# Patient Record
Sex: FEMALE | Race: WHITE | NOT HISPANIC OR LATINO | Employment: OTHER | URBAN - NONMETROPOLITAN AREA
[De-identification: names, ages, dates, MRNs, and addresses within clinical notes are randomized per-mention and may not be internally consistent; named-entity substitution may affect disease eponyms.]

---

## 2017-12-02 ENCOUNTER — HOSPITAL ENCOUNTER (EMERGENCY)
Facility: HOSPITAL | Age: 47
Discharge: HOME | End: 2017-12-02
Attending: EMERGENCY MEDICINE | Admitting: EMERGENCY MEDICINE

## 2017-12-02 ENCOUNTER — LAB REQUISITION (OUTPATIENT)
Dept: LAB | Facility: HOSPITAL | Age: 47
End: 2017-12-02

## 2017-12-02 VITALS
WEIGHT: 215 LBS | HEIGHT: 67 IN | HEART RATE: 99 BPM | RESPIRATION RATE: 18 BRPM | DIASTOLIC BLOOD PRESSURE: 77 MMHG | TEMPERATURE: 98.9 F | OXYGEN SATURATION: 98 % | SYSTOLIC BLOOD PRESSURE: 118 MMHG | BODY MASS INDEX: 33.74 KG/M2

## 2017-12-02 DIAGNOSIS — Z00.00 ROUTINE GENERAL MEDICAL EXAMINATION AT A HEALTH CARE FACILITY: ICD-10-CM

## 2017-12-02 DIAGNOSIS — D64.9 ANEMIA, UNSPECIFIED TYPE: Primary | ICD-10-CM

## 2017-12-02 DIAGNOSIS — D63.1 ANEMIA IN CHRONIC KIDNEY DISEASE (CODE): ICD-10-CM

## 2017-12-02 LAB
ABO GROUP BLD: NORMAL
ABO GROUP BLD: NORMAL
BLD GP AB SCN SERPL QL: NEGATIVE
DEVELOPER EXPIRATION DATE: NORMAL
DEVELOPER LOT NUMBER: NORMAL
EXPIRATION DATE: NORMAL
FECAL OCCULT BLOOD SCREEN, POC: NEGATIVE
HCT VFR BLD AUTO: 18.9 % (ref 37–47)
HGB BLD-MCNC: 5.9 G/DL (ref 12–16)
Lab: NORMAL
NEGATIVE CONTROL: NEGATIVE
POSITIVE CONTROL: POSITIVE
RH BLD: NEGATIVE
RH BLD: NEGATIVE

## 2017-12-02 PROCEDURE — 85018 HEMOGLOBIN: CPT

## 2017-12-02 PROCEDURE — 86900 BLOOD TYPING SEROLOGIC ABO: CPT

## 2017-12-02 PROCEDURE — 86901 BLOOD TYPING SEROLOGIC RH(D): CPT | Performed by: PHYSICIAN ASSISTANT

## 2017-12-02 PROCEDURE — 86850 RBC ANTIBODY SCREEN: CPT | Performed by: PHYSICIAN ASSISTANT

## 2017-12-02 PROCEDURE — 86901 BLOOD TYPING SEROLOGIC RH(D): CPT

## 2017-12-02 PROCEDURE — P9016 RBC LEUKOCYTES REDUCED: HCPCS

## 2017-12-02 PROCEDURE — 82270 OCCULT BLOOD FECES: CPT | Performed by: PHYSICIAN ASSISTANT

## 2017-12-02 PROCEDURE — 86900 BLOOD TYPING SEROLOGIC ABO: CPT | Performed by: PHYSICIAN ASSISTANT

## 2017-12-02 PROCEDURE — 36430 TRANSFUSION BLD/BLD COMPNT: CPT

## 2017-12-02 PROCEDURE — 86920 COMPATIBILITY TEST SPIN: CPT

## 2017-12-02 PROCEDURE — 99284 EMERGENCY DEPT VISIT MOD MDM: CPT

## 2017-12-02 PROCEDURE — 85014 HEMATOCRIT: CPT

## 2017-12-02 RX ORDER — FAMOTIDINE 10 MG
20 TABLET ORAL DAILY
COMMUNITY
End: 2019-12-10

## 2017-12-02 RX ORDER — SODIUM CHLORIDE 9 MG/ML
INJECTION, SOLUTION INTRAVENOUS
Status: DISCONTINUED
Start: 2017-12-02 | End: 2017-12-02 | Stop reason: HOSPADM

## 2017-12-02 RX ORDER — SODIUM CHLORIDE 9 MG/ML
50 INJECTION, SOLUTION INTRAVENOUS CONTINUOUS
Status: DISCONTINUED | OUTPATIENT
Start: 2017-12-02 | End: 2017-12-02 | Stop reason: HOSPADM

## 2017-12-02 RX ORDER — SODIUM CHLORIDE 0.9 % (FLUSH) 0.9 %
10 SYRINGE (ML) INJECTION AS NEEDED
Status: DISCONTINUED | OUTPATIENT
Start: 2017-12-02 | End: 2017-12-02 | Stop reason: HOSPADM

## 2017-12-02 RX ORDER — AMLODIPINE BESYLATE 5 MG/1
5 TABLET ORAL DAILY
Status: ON HOLD | COMMUNITY
End: 2018-06-30

## 2017-12-02 RX ORDER — SODIUM CHLORIDE 9 MG/ML
INJECTION, SOLUTION INTRAVENOUS
Status: COMPLETED
Start: 2017-12-02 | End: 2017-12-02

## 2017-12-02 RX ORDER — BUMETANIDE 1 MG/1
2 TABLET ORAL 2 TIMES DAILY
COMMUNITY
End: 2019-12-10

## 2017-12-02 RX ORDER — HYDROXYZINE PAMOATE 25 MG/1
50 CAPSULE ORAL 4 TIMES DAILY
COMMUNITY
End: 2019-12-10 | Stop reason: SDUPTHER

## 2017-12-02 RX ORDER — TOPIRAMATE 50 MG/1
50 TABLET, FILM COATED ORAL 2 TIMES DAILY
Status: ON HOLD | COMMUNITY
End: 2018-06-30

## 2017-12-02 RX ADMIN — SODIUM CHLORIDE: 0.9 INJECTION, SOLUTION INTRAVENOUS at 13:56

## 2017-12-02 RX ADMIN — SODIUM CHLORIDE: 9 INJECTION, SOLUTION INTRAVENOUS at 13:56

## 2017-12-02 NOTE — ED PROVIDER NOTES
Subjective   Patient is a 47 y.o. female presenting with weakness.   Weakness - Generalized   Severity:  Moderate  Onset quality:  Gradual  Duration:  3 days  Timing:  Constant  Progression:  Worsening  Chronicity:  Recurrent  Context comment:  She was sent from dialysis to receive blood.  She states that she's been feeling weak and as if she were anemic over the past 3 days.  She's had multiple transfusions since August.  She states that she does not see a hematologist.  Associated symptoms: no abdominal pain, no chest pain, no cough, no diarrhea, no dysuria, no fever, no hematochezia, no melena, no nausea and no shortness of breath    Risk factors: anemia        Review of Systems   Constitutional: Negative.  Negative for fever.   HENT: Negative.    Respiratory: Negative.  Negative for cough and shortness of breath.    Cardiovascular: Negative.  Negative for chest pain.   Gastrointestinal: Negative.  Negative for abdominal pain, diarrhea, hematochezia, melena and nausea.   Endocrine: Negative.    Genitourinary: Negative.  Negative for dysuria.   Skin: Negative.    Psychiatric/Behavioral: Negative.    All other systems reviewed and are negative.      Past Medical History:   Diagnosis Date   • Dialysis patient    • Histoplasmosis    • Hypertension    • Renal disorder        Allergies   Allergen Reactions   • Latex        Past Surgical History:   Procedure Laterality Date   • TUBAL ABDOMINAL LIGATION         History reviewed. No pertinent family history.    Social History     Social History   • Marital status: Unknown     Spouse name: N/A   • Number of children: N/A   • Years of education: N/A     Social History Main Topics   • Smoking status: Current Every Day Smoker     Packs/day: 0.50     Types: Cigarettes   • Smokeless tobacco: None   • Alcohol use No   • Drug use: No   • Sexual activity: Not Asked     Other Topics Concern   • None     Social History Narrative   • None           Objective   Physical Exam    Constitutional: She is oriented to person, place, and time. She appears well-developed and well-nourished. No distress.   Pale   HENT:   Head: Normocephalic and atraumatic.   Right Ear: External ear normal.   Left Ear: External ear normal.   Nose: Nose normal.   Eyes: Conjunctivae and EOM are normal. Pupils are equal, round, and reactive to light.   Neck: Normal range of motion. Neck supple. No JVD present. No tracheal deviation present.   Cardiovascular: Normal rate, regular rhythm and normal heart sounds.    No murmur heard.  Pulmonary/Chest: Effort normal and breath sounds normal. No respiratory distress. She has no wheezes.   Abdominal: Soft. Bowel sounds are normal. There is no tenderness.   Musculoskeletal: Normal range of motion. She exhibits no edema or deformity.   Neurological: She is alert and oriented to person, place, and time. No cranial nerve deficit.   Skin: Skin is warm and dry. No rash noted. She is not diaphoretic. No erythema. No pallor.   Psychiatric: She has a normal mood and affect. Her behavior is normal. Thought content normal.   Nursing note and vitals reviewed.      Procedures         ED Course  ED Course                  MDM  Number of Diagnoses or Management Options  established and worsening     Amount and/or Complexity of Data Reviewed  Clinical lab tests: reviewed  Discuss the patient with other providers: yes    Risk of Complications, Morbidity, and/or Mortality  Presenting problems: moderate        Final diagnoses:   Anemia, unspecified type            ELIZABETH Farrar  12/02/17 1931

## 2017-12-03 LAB
ABO + RH BLD: NORMAL
ABO + RH BLD: NORMAL
BH BB BLOOD EXPIRATION DATE: NORMAL
BH BB BLOOD EXPIRATION DATE: NORMAL
BH BB BLOOD TYPE BARCODE: 9500
BH BB BLOOD TYPE BARCODE: 9500
BH BB DISPENSE STATUS: NORMAL
BH BB DISPENSE STATUS: NORMAL
BH BB PRODUCT CODE: NORMAL
BH BB PRODUCT CODE: NORMAL
BH BB UNIT NUMBER: NORMAL
BH BB UNIT NUMBER: NORMAL
CROSSMATCH INTERPRETATION: NORMAL
CROSSMATCH INTERPRETATION: NORMAL
UNIT  ABO: NORMAL
UNIT  ABO: NORMAL
UNIT  RH: NORMAL
UNIT  RH: NORMAL

## 2018-06-30 ENCOUNTER — ANESTHESIA EVENT (OUTPATIENT)
Dept: PERIOP | Facility: HOSPITAL | Age: 48
End: 2018-06-30

## 2018-06-30 ENCOUNTER — APPOINTMENT (OUTPATIENT)
Dept: GENERAL RADIOLOGY | Facility: HOSPITAL | Age: 48
End: 2018-06-30

## 2018-06-30 ENCOUNTER — HOSPITAL ENCOUNTER (OUTPATIENT)
Facility: HOSPITAL | Age: 48
Discharge: HOME | End: 2018-07-01
Attending: FAMILY MEDICINE | Admitting: INTERNAL MEDICINE

## 2018-06-30 ENCOUNTER — ANESTHESIA (OUTPATIENT)
Dept: PERIOP | Facility: HOSPITAL | Age: 48
End: 2018-06-30

## 2018-06-30 DIAGNOSIS — N18.6 ESRD (END STAGE RENAL DISEASE) (HCC): ICD-10-CM

## 2018-06-30 DIAGNOSIS — Z78.9 PROBLEM WITH VASCULAR ACCESS: Primary | ICD-10-CM

## 2018-06-30 LAB
ALBUMIN SERPL-MCNC: 4 G/DL (ref 3.5–5)
ALBUMIN/GLOB SERPL: 1.7 G/DL (ref 1.5–2.5)
ALP SERPL-CCNC: 78 U/L (ref 35–104)
ALT SERPL W P-5'-P-CCNC: 14 U/L (ref 10–36)
ANION GAP SERPL CALCULATED.3IONS-SCNC: 6.2 MMOL/L (ref 3.6–11.2)
AST SERPL-CCNC: 20 U/L (ref 10–30)
BASOPHILS # BLD AUTO: 0.04 10*3/MM3 (ref 0–0.3)
BASOPHILS NFR BLD AUTO: 0.5 % (ref 0–2)
BILIRUB SERPL-MCNC: 0.2 MG/DL (ref 0.2–1.8)
BUN BLD-MCNC: 40 MG/DL (ref 7–21)
BUN/CREAT SERPL: 9 (ref 7–25)
CALCIUM SPEC-SCNC: 9.2 MG/DL (ref 7.7–10)
CHLORIDE SERPL-SCNC: 111 MMOL/L (ref 99–112)
CO2 SERPL-SCNC: 25.8 MMOL/L (ref 24.3–31.9)
CREAT BLD-MCNC: 4.45 MG/DL (ref 0.43–1.29)
DEPRECATED RDW RBC AUTO: 57.7 FL (ref 37–54)
EOSINOPHIL # BLD AUTO: 0.47 10*3/MM3 (ref 0–0.7)
EOSINOPHIL NFR BLD AUTO: 5.6 % (ref 0–5)
ERYTHROCYTE [DISTWIDTH] IN BLOOD BY AUTOMATED COUNT: 15.6 % (ref 11.5–14.5)
GFR SERPL CREATININE-BSD FRML MDRD: 11 ML/MIN/1.73
GLOBULIN UR ELPH-MCNC: 2.3 GM/DL
GLUCOSE BLD-MCNC: 95 MG/DL (ref 70–110)
HCT VFR BLD AUTO: 29.9 % (ref 37–47)
HGB BLD-MCNC: 9.6 G/DL (ref 12–16)
HOLD SPECIMEN: NORMAL
HOLD SPECIMEN: NORMAL
IMM GRANULOCYTES # BLD: 0.05 10*3/MM3 (ref 0–0.03)
IMM GRANULOCYTES NFR BLD: 0.6 % (ref 0–0.5)
INR PPP: 1.03 (ref 0.9–1.1)
LYMPHOCYTES # BLD AUTO: 1.31 10*3/MM3 (ref 1–3)
LYMPHOCYTES NFR BLD AUTO: 15.6 % (ref 21–51)
MAGNESIUM SERPL-MCNC: 2 MG/DL (ref 1.7–2.6)
MCH RBC QN AUTO: 33.4 PG (ref 27–33)
MCHC RBC AUTO-ENTMCNC: 32.1 G/DL (ref 33–37)
MCV RBC AUTO: 104.2 FL (ref 80–94)
MONOCYTES # BLD AUTO: 0.63 10*3/MM3 (ref 0.1–0.9)
MONOCYTES NFR BLD AUTO: 7.5 % (ref 0–10)
NEUTROPHILS # BLD AUTO: 5.9 10*3/MM3 (ref 1.4–6.5)
NEUTROPHILS NFR BLD AUTO: 70.2 % (ref 30–70)
OSMOLALITY SERPL CALC.SUM OF ELEC: 294.5 MOSM/KG (ref 273–305)
PHOSPHATE SERPL-MCNC: 3.7 MG/DL (ref 2.7–4.5)
PLATELET # BLD AUTO: 232 10*3/MM3 (ref 130–400)
PMV BLD AUTO: 8.5 FL (ref 6–10)
POTASSIUM BLD-SCNC: 4.2 MMOL/L (ref 3.5–5.3)
PROT SERPL-MCNC: 6.3 G/DL (ref 6–8)
PROTHROMBIN TIME: 13.8 SECONDS (ref 11–15.4)
RBC # BLD AUTO: 2.87 10*6/MM3 (ref 4.2–5.4)
SODIUM BLD-SCNC: 143 MMOL/L (ref 135–153)
TROPONIN I SERPL-MCNC: 0.01 NG/ML
TSH SERPL DL<=0.05 MIU/L-ACNC: 0.83 MIU/ML (ref 0.55–4.78)
WBC NRBC COR # BLD: 8.4 10*3/MM3 (ref 4.5–12.5)
WHOLE BLOOD HOLD SPECIMEN: NORMAL
WHOLE BLOOD HOLD SPECIMEN: NORMAL

## 2018-06-30 PROCEDURE — 71045 X-RAY EXAM CHEST 1 VIEW: CPT

## 2018-06-30 PROCEDURE — 99219 PR INITIAL OBSERVATION CARE/DAY 50 MINUTES: CPT | Performed by: INTERNAL MEDICINE

## 2018-06-30 PROCEDURE — 84484 ASSAY OF TROPONIN QUANT: CPT | Performed by: FAMILY MEDICINE

## 2018-06-30 PROCEDURE — 25010000002 PROPOFOL 10 MG/ML EMULSION: Performed by: ANESTHESIOLOGY

## 2018-06-30 PROCEDURE — 94799 UNLISTED PULMONARY SVC/PX: CPT

## 2018-06-30 PROCEDURE — 84100 ASSAY OF PHOSPHORUS: CPT | Performed by: FAMILY MEDICINE

## 2018-06-30 PROCEDURE — 80053 COMPREHEN METABOLIC PANEL: CPT | Performed by: FAMILY MEDICINE

## 2018-06-30 PROCEDURE — 76000 FLUOROSCOPY <1 HR PHYS/QHP: CPT

## 2018-06-30 PROCEDURE — 93005 ELECTROCARDIOGRAM TRACING: CPT | Performed by: FAMILY MEDICINE

## 2018-06-30 PROCEDURE — 99284 EMERGENCY DEPT VISIT MOD MDM: CPT

## 2018-06-30 PROCEDURE — G0378 HOSPITAL OBSERVATION PER HR: HCPCS

## 2018-06-30 PROCEDURE — 71045 X-RAY EXAM CHEST 1 VIEW: CPT | Performed by: RADIOLOGY

## 2018-06-30 PROCEDURE — 87040 BLOOD CULTURE FOR BACTERIA: CPT | Performed by: NURSE PRACTITIONER

## 2018-06-30 PROCEDURE — 25010000002 FENTANYL CITRATE (PF) 100 MCG/2ML SOLUTION: Performed by: ANESTHESIOLOGY

## 2018-06-30 PROCEDURE — 93010 ELECTROCARDIOGRAM REPORT: CPT | Performed by: INTERNAL MEDICINE

## 2018-06-30 PROCEDURE — 36415 COLL VENOUS BLD VENIPUNCTURE: CPT

## 2018-06-30 PROCEDURE — C1750 CATH, HEMODIALYSIS,LONG-TERM: HCPCS | Performed by: SURGERY

## 2018-06-30 PROCEDURE — 25010000002 VANCOMYCIN PER 500 MG: Performed by: SURGERY

## 2018-06-30 PROCEDURE — 25010000002 HEPARIN FLUSH (PORCINE) 100 UNIT/ML SOLUTION: Performed by: SURGERY

## 2018-06-30 PROCEDURE — 85025 COMPLETE CBC W/AUTO DIFF WBC: CPT | Performed by: FAMILY MEDICINE

## 2018-06-30 PROCEDURE — 83735 ASSAY OF MAGNESIUM: CPT | Performed by: FAMILY MEDICINE

## 2018-06-30 PROCEDURE — 84443 ASSAY THYROID STIM HORMONE: CPT | Performed by: NURSE PRACTITIONER

## 2018-06-30 PROCEDURE — 85610 PROTHROMBIN TIME: CPT | Performed by: FAMILY MEDICINE

## 2018-06-30 RX ORDER — MULTIVITAMIN
1 TABLET ORAL DAILY
Status: DISCONTINUED | OUTPATIENT
Start: 2018-07-01 | End: 2018-07-02 | Stop reason: HOSPADM

## 2018-06-30 RX ORDER — PANTOPRAZOLE SODIUM 40 MG/1
40 TABLET, DELAYED RELEASE ORAL DAILY
Status: DISCONTINUED | OUTPATIENT
Start: 2018-07-01 | End: 2018-07-02 | Stop reason: HOSPADM

## 2018-06-30 RX ORDER — HEPARIN SODIUM 5000 [USP'U]/ML
INJECTION, SOLUTION INTRAVENOUS; SUBCUTANEOUS
Status: DISPENSED
Start: 2018-06-30 | End: 2018-06-30

## 2018-06-30 RX ORDER — ITRACONAZOLE 100 MG/1
200 CAPSULE ORAL 2 TIMES DAILY
COMMUNITY
End: 2021-02-10

## 2018-06-30 RX ORDER — HYDROXYZINE 50 MG/1
50 TABLET, FILM COATED ORAL EVERY 6 HOURS
Status: DISCONTINUED | OUTPATIENT
Start: 2018-06-30 | End: 2018-07-02 | Stop reason: HOSPADM

## 2018-06-30 RX ORDER — HEPARIN SODIUM 5000 [USP'U]/ML
5000 INJECTION, SOLUTION INTRAVENOUS; SUBCUTANEOUS EVERY 12 HOURS SCHEDULED
Status: DISCONTINUED | OUTPATIENT
Start: 2018-06-30 | End: 2018-07-02 | Stop reason: HOSPADM

## 2018-06-30 RX ORDER — LEVOTHYROXINE SODIUM 0.05 MG/1
50 TABLET ORAL DAILY
COMMUNITY

## 2018-06-30 RX ORDER — BUPIVACAINE HYDROCHLORIDE AND EPINEPHRINE 5; 5 MG/ML; UG/ML
INJECTION, SOLUTION EPIDURAL; INTRACAUDAL; PERINEURAL AS NEEDED
Status: DISCONTINUED | OUTPATIENT
Start: 2018-06-30 | End: 2018-06-30 | Stop reason: HOSPADM

## 2018-06-30 RX ORDER — FAMOTIDINE 20 MG/1
20 TABLET, FILM COATED ORAL 2 TIMES DAILY
Status: DISCONTINUED | OUTPATIENT
Start: 2018-06-30 | End: 2018-06-30

## 2018-06-30 RX ORDER — CYCLOPHOSPHAMIDE 50 MG/1
50 TABLET ORAL 2 TIMES DAILY
COMMUNITY
End: 2019-12-10

## 2018-06-30 RX ORDER — GABAPENTIN 400 MG/1
800 CAPSULE ORAL EVERY 8 HOURS SCHEDULED
Status: CANCELLED | OUTPATIENT
Start: 2018-06-30

## 2018-06-30 RX ORDER — METHOCARBAMOL 750 MG/1
750 TABLET, FILM COATED ORAL 4 TIMES DAILY PRN
Status: ON HOLD | COMMUNITY
End: 2018-06-30

## 2018-06-30 RX ORDER — BACTERIOSTATIC SODIUM CHLORIDE 0.9 %
VIAL (ML) INJECTION AS NEEDED
Status: DISCONTINUED | OUTPATIENT
Start: 2018-06-30 | End: 2018-06-30 | Stop reason: HOSPADM

## 2018-06-30 RX ORDER — METHOCARBAMOL 750 MG/1
750 TABLET, FILM COATED ORAL 3 TIMES DAILY PRN
COMMUNITY

## 2018-06-30 RX ORDER — TOPIRAMATE 100 MG/1
100 TABLET, FILM COATED ORAL 2 TIMES DAILY
COMMUNITY
End: 2019-12-10

## 2018-06-30 RX ORDER — TOPIRAMATE 100 MG/1
100 TABLET, FILM COATED ORAL EVERY 12 HOURS SCHEDULED
Status: DISCONTINUED | OUTPATIENT
Start: 2018-06-30 | End: 2018-07-02 | Stop reason: HOSPADM

## 2018-06-30 RX ORDER — IPRATROPIUM BROMIDE AND ALBUTEROL SULFATE 2.5; .5 MG/3ML; MG/3ML
3 SOLUTION RESPIRATORY (INHALATION) ONCE AS NEEDED
Status: DISCONTINUED | OUTPATIENT
Start: 2018-06-30 | End: 2018-06-30 | Stop reason: HOSPADM

## 2018-06-30 RX ORDER — FAMOTIDINE 20 MG/1
20 TABLET, FILM COATED ORAL DAILY
Status: DISCONTINUED | OUTPATIENT
Start: 2018-07-01 | End: 2018-07-02 | Stop reason: HOSPADM

## 2018-06-30 RX ORDER — SODIUM CHLORIDE 0.9 % (FLUSH) 0.9 %
1-10 SYRINGE (ML) INJECTION AS NEEDED
Status: DISCONTINUED | OUTPATIENT
Start: 2018-06-30 | End: 2018-06-30 | Stop reason: HOSPADM

## 2018-06-30 RX ORDER — ERGOCALCIFEROL 1.25 MG/1
50000 CAPSULE ORAL WEEKLY
COMMUNITY
End: 2019-12-10

## 2018-06-30 RX ORDER — SODIUM CHLORIDE 0.9 % (FLUSH) 0.9 %
10 SYRINGE (ML) INJECTION AS NEEDED
Status: DISCONTINUED | OUTPATIENT
Start: 2018-06-30 | End: 2018-07-02 | Stop reason: HOSPADM

## 2018-06-30 RX ORDER — CYCLOPHOSPHAMIDE 50 MG/1
50 CAPSULE ORAL 2 TIMES DAILY
Status: DISCONTINUED | OUTPATIENT
Start: 2018-06-30 | End: 2018-07-02 | Stop reason: HOSPADM

## 2018-06-30 RX ORDER — LEVOTHYROXINE SODIUM 0.03 MG/1
25 TABLET ORAL DAILY
Status: ON HOLD | COMMUNITY
End: 2018-06-30

## 2018-06-30 RX ORDER — MEPERIDINE HYDROCHLORIDE 50 MG/ML
12.5 INJECTION INTRAMUSCULAR; INTRAVENOUS; SUBCUTANEOUS
Status: DISCONTINUED | OUTPATIENT
Start: 2018-06-30 | End: 2018-06-30 | Stop reason: HOSPADM

## 2018-06-30 RX ORDER — MAGNESIUM HYDROXIDE 1200 MG/15ML
LIQUID ORAL AS NEEDED
Status: DISCONTINUED | OUTPATIENT
Start: 2018-06-30 | End: 2018-06-30 | Stop reason: HOSPADM

## 2018-06-30 RX ORDER — CALCIUM ACETATE 667 MG/1
667 CAPSULE ORAL 4 TIMES DAILY
Status: DISCONTINUED | OUTPATIENT
Start: 2018-06-30 | End: 2018-07-02 | Stop reason: HOSPADM

## 2018-06-30 RX ORDER — SODIUM CHLORIDE 9 MG/ML
INJECTION, SOLUTION INTRAVENOUS CONTINUOUS PRN
Status: DISCONTINUED | OUTPATIENT
Start: 2018-06-30 | End: 2018-06-30 | Stop reason: SURG

## 2018-06-30 RX ORDER — GABAPENTIN 800 MG/1
800 TABLET ORAL 3 TIMES DAILY
COMMUNITY
End: 2018-07-01 | Stop reason: HOSPADM

## 2018-06-30 RX ORDER — SODIUM CHLORIDE 0.9 % (FLUSH) 0.9 %
1-10 SYRINGE (ML) INJECTION AS NEEDED
Status: DISCONTINUED | OUTPATIENT
Start: 2018-06-30 | End: 2018-07-02 | Stop reason: HOSPADM

## 2018-06-30 RX ORDER — PANTOPRAZOLE SODIUM 40 MG/1
40 TABLET, DELAYED RELEASE ORAL DAILY
COMMUNITY
End: 2018-07-01 | Stop reason: HOSPADM

## 2018-06-30 RX ORDER — FENTANYL CITRATE 50 UG/ML
50 INJECTION, SOLUTION INTRAMUSCULAR; INTRAVENOUS
Status: DISCONTINUED | OUTPATIENT
Start: 2018-06-30 | End: 2018-06-30 | Stop reason: HOSPADM

## 2018-06-30 RX ORDER — LEVOTHYROXINE SODIUM 0.05 MG/1
50 TABLET ORAL DAILY
Status: DISCONTINUED | OUTPATIENT
Start: 2018-07-01 | End: 2018-07-02 | Stop reason: HOSPADM

## 2018-06-30 RX ORDER — MULTIVITAMIN
1 TABLET ORAL DAILY
COMMUNITY
End: 2021-02-10

## 2018-06-30 RX ORDER — BUMETANIDE 1 MG/1
2 TABLET ORAL 2 TIMES DAILY
Status: DISCONTINUED | OUTPATIENT
Start: 2018-06-30 | End: 2018-07-02 | Stop reason: HOSPADM

## 2018-06-30 RX ORDER — PROPOFOL 10 MG/ML
VIAL (ML) INTRAVENOUS AS NEEDED
Status: DISCONTINUED | OUTPATIENT
Start: 2018-06-30 | End: 2018-06-30 | Stop reason: SURG

## 2018-06-30 RX ORDER — METHOCARBAMOL 500 MG/1
500 TABLET, FILM COATED ORAL EVERY 8 HOURS PRN
Status: DISCONTINUED | OUTPATIENT
Start: 2018-06-30 | End: 2018-07-02 | Stop reason: HOSPADM

## 2018-06-30 RX ORDER — SODIUM CHLORIDE, SODIUM LACTATE, POTASSIUM CHLORIDE, CALCIUM CHLORIDE 600; 310; 30; 20 MG/100ML; MG/100ML; MG/100ML; MG/100ML
125 INJECTION, SOLUTION INTRAVENOUS CONTINUOUS
Status: DISCONTINUED | OUTPATIENT
Start: 2018-06-30 | End: 2018-07-01

## 2018-06-30 RX ORDER — ONDANSETRON 2 MG/ML
4 INJECTION INTRAMUSCULAR; INTRAVENOUS ONCE AS NEEDED
Status: DISCONTINUED | OUTPATIENT
Start: 2018-06-30 | End: 2018-06-30 | Stop reason: HOSPADM

## 2018-06-30 RX ORDER — NITROGLYCERIN 0.4 MG/1
0.4 TABLET SUBLINGUAL
Status: DISCONTINUED | OUTPATIENT
Start: 2018-06-30 | End: 2018-07-02 | Stop reason: HOSPADM

## 2018-06-30 RX ORDER — GABAPENTIN 300 MG/1
300 CAPSULE ORAL NIGHTLY
Status: DISCONTINUED | OUTPATIENT
Start: 2018-06-30 | End: 2018-07-02 | Stop reason: HOSPADM

## 2018-06-30 RX ORDER — ITRACONAZOLE 100 MG/1
200 CAPSULE ORAL 2 TIMES DAILY
Status: DISCONTINUED | OUTPATIENT
Start: 2018-07-01 | End: 2018-07-02 | Stop reason: HOSPADM

## 2018-06-30 RX ADMIN — PROPOFOL 100 MG: 10 INJECTION, EMULSION INTRAVENOUS at 17:56

## 2018-06-30 RX ADMIN — VANCOMYCIN HYDROCHLORIDE 1750 MG: 5 INJECTION, POWDER, LYOPHILIZED, FOR SOLUTION INTRAVENOUS at 19:00

## 2018-06-30 RX ADMIN — FENTANYL CITRATE 250 MCG: 50 INJECTION, SOLUTION INTRAMUSCULAR; INTRAVENOUS at 17:56

## 2018-06-30 RX ADMIN — SODIUM CHLORIDE: 9 INJECTION, SOLUTION INTRAVENOUS at 17:51

## 2018-06-30 RX ADMIN — GABAPENTIN 300 MG: 300 CAPSULE ORAL at 21:22

## 2018-06-30 RX ADMIN — METOPROLOL TARTRATE 12.5 MG: 25 TABLET, FILM COATED ORAL at 21:22

## 2018-06-30 RX ADMIN — TOPIRAMATE 100 MG: 100 TABLET, FILM COATED ORAL at 21:22

## 2018-06-30 NOTE — ANESTHESIA PREPROCEDURE EVALUATION
Anesthesia Evaluation     Patient summary reviewed and Nursing notes reviewed   history of anesthetic complications: prolonged sedation  NPO Solid Status: > 8 hours  NPO Liquid Status: > 8 hours           Airway   Mallampati: III  TM distance: <3 FB  Neck ROM: limited  Difficult intubation highly probable, Small opening, Anterior and Large neck circumference  Dental - normal exam   (+) poor dentition    Pulmonary - normal exam   (+) a smoker Current Smoked day of surgery, sleep apnea on CPAP,   (-) asthma  Cardiovascular - normal exam  Exercise tolerance: good (4-7 METS)    NYHA Classification: II  Patient on routine beta blocker and Beta blocker given within 24 hours of surgery    (+) hypertension, dysrhythmias,   (-) past MI, angina, CHF      Neuro/Psych- negative ROS  (-) seizures, CVA  GI/Hepatic/Renal/Endo    (+) morbid obesity, GERD,  renal disease ESRD, dialysis and CRI, hypothyroidism,     Musculoskeletal     (+) back pain, radiculopathy Left lower extremity  Abdominal  - normal exam    Bowel sounds: normal.   Substance History - negative use     OB/GYN negative ob/gyn ROS         Other   (+) arthritis         Phys Exam Other: Dental counseling discussed              Anesthesia Plan    ASA 4     general     intravenous induction   Anesthetic plan and risks discussed with patient.  Use of blood products discussed with patient  Consented to blood products.

## 2018-06-30 NOTE — ANESTHESIA POSTPROCEDURE EVALUATION
Patient: Mercedes Che    Procedure Summary     Date:  06/30/18 Room / Location:  [facility] OR 02 /  [facility] OR    Anesthesia Start:  1751 Anesthesia Stop:  1904    Procedure:  REMOVAL AND INSERTION OF TUNNELED DIAYLSIS CATHETER (N/A ) Diagnosis:       ESRD (end stage renal disease)      Problem with vascular access      (ESRD (end stage renal disease) [N18.6])      (Problem with vascular access [Z78.9])    Surgeon:  Jose Alberto Urena MD Provider:  Geovanni Zuniga MD    Anesthesia Type:  general ASA Status:  4          Anesthesia Type: general  Last vitals  BP   130/74 (06/30/18 1906)   Temp   97.2 °F (36.2 °C) (06/30/18 1901)   Pulse   99 (06/30/18 1906)   Resp   18 (06/30/18 1906)     SpO2   100 % (06/30/18 1906)     Post Anesthesia Care and Evaluation    Patient location during evaluation: PACU  Patient participation: complete - patient participated  Level of consciousness: awake and alert  Pain score: 1  Pain management: adequate  Airway patency: patent  Anesthetic complications: No anesthetic complications  PONV Status: controlled  Cardiovascular status: acceptable  Respiratory status: acceptable  Hydration status: acceptable

## 2018-07-01 ENCOUNTER — APPOINTMENT (OUTPATIENT)
Dept: GENERAL RADIOLOGY | Facility: HOSPITAL | Age: 48
End: 2018-07-01

## 2018-07-01 ENCOUNTER — ANESTHESIA EVENT (OUTPATIENT)
Dept: PERIOP | Facility: HOSPITAL | Age: 48
End: 2018-07-01

## 2018-07-01 ENCOUNTER — ANESTHESIA (OUTPATIENT)
Dept: PERIOP | Facility: HOSPITAL | Age: 48
End: 2018-07-01

## 2018-07-01 VITALS
HEIGHT: 67 IN | TEMPERATURE: 97.7 F | SYSTOLIC BLOOD PRESSURE: 120 MMHG | OXYGEN SATURATION: 97 % | HEART RATE: 118 BPM | RESPIRATION RATE: 18 BRPM | WEIGHT: 272 LBS | BODY MASS INDEX: 42.69 KG/M2 | DIASTOLIC BLOOD PRESSURE: 80 MMHG

## 2018-07-01 PROBLEM — Z78.9 PROBLEM WITH VASCULAR ACCESS: Status: ACTIVE | Noted: 2018-06-30

## 2018-07-01 PROBLEM — Z78.9 PROBLEM WITH VASCULAR ACCESS: Status: RESOLVED | Noted: 2018-06-30 | Resolved: 2018-07-01

## 2018-07-01 LAB
ALBUMIN SERPL-MCNC: 3.7 G/DL (ref 3.5–5)
ALBUMIN/GLOB SERPL: 1.6 G/DL (ref 1.5–2.5)
ALP SERPL-CCNC: 66 U/L (ref 35–104)
ALT SERPL W P-5'-P-CCNC: 12 U/L (ref 10–36)
ANION GAP SERPL CALCULATED.3IONS-SCNC: 5.9 MMOL/L (ref 3.6–11.2)
AST SERPL-CCNC: 18 U/L (ref 10–30)
BILIRUB SERPL-MCNC: 0.2 MG/DL (ref 0.2–1.8)
BUN BLD-MCNC: 37 MG/DL (ref 7–21)
BUN/CREAT SERPL: 7.9 (ref 7–25)
CALCIUM SPEC-SCNC: 9.2 MG/DL (ref 7.7–10)
CHLORIDE SERPL-SCNC: 109 MMOL/L (ref 99–112)
CO2 SERPL-SCNC: 22.1 MMOL/L (ref 24.3–31.9)
CREAT BLD-MCNC: 4.71 MG/DL (ref 0.43–1.29)
DEPRECATED RDW RBC AUTO: 60.3 FL (ref 37–54)
EOSINOPHIL # BLD MANUAL: 0.29 10*3/MM3 (ref 0–0.7)
EOSINOPHIL NFR BLD MANUAL: 3 % (ref 0–5)
ERYTHROCYTE [DISTWIDTH] IN BLOOD BY AUTOMATED COUNT: 15.9 % (ref 11.5–14.5)
FOLATE SERPL-MCNC: 16.26 NG/ML (ref 5.4–20)
GFR SERPL CREATININE-BSD FRML MDRD: 10 ML/MIN/1.73
GLOBULIN UR ELPH-MCNC: 2.3 GM/DL
GLUCOSE BLD-MCNC: 100 MG/DL (ref 70–110)
HCT VFR BLD AUTO: 27.6 % (ref 37–47)
HGB BLD-MCNC: 8.8 G/DL (ref 12–16)
LYMPHOCYTES # BLD MANUAL: 0.97 10*3/MM3 (ref 1–3)
LYMPHOCYTES NFR BLD MANUAL: 10 % (ref 21–51)
LYMPHOCYTES NFR BLD MANUAL: 3 % (ref 0–10)
MAGNESIUM SERPL-MCNC: 2 MG/DL (ref 1.7–2.6)
MCH RBC QN AUTO: 32.7 PG (ref 27–33)
MCHC RBC AUTO-ENTMCNC: 31.9 G/DL (ref 33–37)
MCV RBC AUTO: 102.6 FL (ref 80–94)
MONOCYTES # BLD AUTO: 0.29 10*3/MM3 (ref 0.1–0.9)
NEUTROPHILS # BLD AUTO: 8.12 10*3/MM3 (ref 1.4–6.5)
NEUTROPHILS NFR BLD MANUAL: 84 % (ref 30–70)
OSMOLALITY SERPL CALC.SUM OF ELEC: 282.6 MOSM/KG (ref 273–305)
PHOSPHATE SERPL-MCNC: 4.5 MG/DL (ref 2.7–4.5)
PLAT MORPH BLD: NORMAL
PLATELET # BLD AUTO: 203 10*3/MM3 (ref 130–400)
PMV BLD AUTO: 8.8 FL (ref 6–10)
POTASSIUM BLD-SCNC: 4.3 MMOL/L (ref 3.5–5.3)
PROT SERPL-MCNC: 6 G/DL (ref 6–8)
RBC # BLD AUTO: 2.69 10*6/MM3 (ref 4.2–5.4)
RBC MORPH BLD: NORMAL
SCAN SLIDE: NORMAL
SODIUM BLD-SCNC: 137 MMOL/L (ref 135–153)
VIT B12 BLD-MCNC: 614 PG/ML (ref 211–911)
WBC NRBC COR # BLD: 9.67 10*3/MM3 (ref 4.5–12.5)

## 2018-07-01 PROCEDURE — G0378 HOSPITAL OBSERVATION PER HR: HCPCS

## 2018-07-01 PROCEDURE — 25010000002 SUCCINYLCHOLINE PER 20 MG: Performed by: ANESTHESIOLOGY

## 2018-07-01 PROCEDURE — 71045 X-RAY EXAM CHEST 1 VIEW: CPT | Performed by: RADIOLOGY

## 2018-07-01 PROCEDURE — 25010000002 DEXAMETHASONE PER 1 MG: Performed by: ANESTHESIOLOGY

## 2018-07-01 PROCEDURE — 71045 X-RAY EXAM CHEST 1 VIEW: CPT

## 2018-07-01 PROCEDURE — 80053 COMPREHEN METABOLIC PANEL: CPT | Performed by: NURSE PRACTITIONER

## 2018-07-01 PROCEDURE — 36581 REPLACE TUNNELED CV CATH: CPT | Performed by: SURGERY

## 2018-07-01 PROCEDURE — 84100 ASSAY OF PHOSPHORUS: CPT | Performed by: NURSE PRACTITIONER

## 2018-07-01 PROCEDURE — 85025 COMPLETE CBC W/AUTO DIFF WBC: CPT | Performed by: NURSE PRACTITIONER

## 2018-07-01 PROCEDURE — 83735 ASSAY OF MAGNESIUM: CPT | Performed by: NURSE PRACTITIONER

## 2018-07-01 PROCEDURE — 36589 REMOVAL TUNNELED CV CATH: CPT | Performed by: SURGERY

## 2018-07-01 PROCEDURE — 25010000002 ONDANSETRON PER 1 MG: Performed by: ANESTHESIOLOGY

## 2018-07-01 PROCEDURE — 82607 VITAMIN B-12: CPT | Performed by: INTERNAL MEDICINE

## 2018-07-01 PROCEDURE — 25010000002 FENTANYL CITRATE (PF) 100 MCG/2ML SOLUTION: Performed by: ANESTHESIOLOGY

## 2018-07-01 PROCEDURE — 25010000002 PROPOFOL 10 MG/ML EMULSION: Performed by: ANESTHESIOLOGY

## 2018-07-01 PROCEDURE — 25010000002 MORPHINE PER 10 MG: Performed by: SURGERY

## 2018-07-01 PROCEDURE — 99217 PR OBSERVATION CARE DISCHARGE MANAGEMENT: CPT | Performed by: NURSE PRACTITIONER

## 2018-07-01 PROCEDURE — 94799 UNLISTED PULMONARY SVC/PX: CPT

## 2018-07-01 PROCEDURE — 76000 FLUOROSCOPY <1 HR PHYS/QHP: CPT

## 2018-07-01 PROCEDURE — 25010000002 HEPARIN FLUSH (PORCINE) 100 UNIT/ML SOLUTION: Performed by: SURGERY

## 2018-07-01 PROCEDURE — 63710000001 CYCLOPHOSPHAMIDE PER 25 MG: Performed by: NURSE PRACTITIONER

## 2018-07-01 PROCEDURE — G0257 UNSCHED DIALYSIS ESRD PT HOS: HCPCS

## 2018-07-01 PROCEDURE — C1750 CATH, HEMODIALYSIS,LONG-TERM: HCPCS | Performed by: SURGERY

## 2018-07-01 PROCEDURE — 82746 ASSAY OF FOLIC ACID SERUM: CPT | Performed by: INTERNAL MEDICINE

## 2018-07-01 RX ORDER — BUPIVACAINE HYDROCHLORIDE AND EPINEPHRINE 2.5; 5 MG/ML; UG/ML
INJECTION, SOLUTION EPIDURAL; INFILTRATION; INTRACAUDAL; PERINEURAL AS NEEDED
Status: DISCONTINUED | OUTPATIENT
Start: 2018-07-01 | End: 2018-07-01 | Stop reason: HOSPADM

## 2018-07-01 RX ORDER — PROPOFOL 10 MG/ML
VIAL (ML) INTRAVENOUS AS NEEDED
Status: DISCONTINUED | OUTPATIENT
Start: 2018-07-01 | End: 2018-07-01 | Stop reason: SURG

## 2018-07-01 RX ORDER — SUCCINYLCHOLINE CHLORIDE 20 MG/ML
INJECTION INTRAMUSCULAR; INTRAVENOUS AS NEEDED
Status: DISCONTINUED | OUTPATIENT
Start: 2018-07-01 | End: 2018-07-01 | Stop reason: SURG

## 2018-07-01 RX ORDER — FENTANYL CITRATE 50 UG/ML
50 INJECTION, SOLUTION INTRAMUSCULAR; INTRAVENOUS
Status: DISCONTINUED | OUTPATIENT
Start: 2018-07-01 | End: 2018-07-01 | Stop reason: HOSPADM

## 2018-07-01 RX ORDER — GABAPENTIN 300 MG/1
300 CAPSULE ORAL NIGHTLY
Qty: 3 CAPSULE | Refills: 0 | Status: SHIPPED | OUTPATIENT
Start: 2018-07-01 | End: 2019-12-10

## 2018-07-01 RX ORDER — ONDANSETRON 2 MG/ML
4 INJECTION INTRAMUSCULAR; INTRAVENOUS ONCE AS NEEDED
Status: DISCONTINUED | OUTPATIENT
Start: 2018-07-01 | End: 2018-07-01 | Stop reason: HOSPADM

## 2018-07-01 RX ORDER — ACETAMINOPHEN 325 MG/1
650 TABLET ORAL EVERY 6 HOURS PRN
Status: DISCONTINUED | OUTPATIENT
Start: 2018-07-01 | End: 2018-07-02 | Stop reason: HOSPADM

## 2018-07-01 RX ORDER — OXYCODONE HYDROCHLORIDE AND ACETAMINOPHEN 5; 325 MG/1; MG/1
1 TABLET ORAL ONCE AS NEEDED
Status: DISCONTINUED | OUTPATIENT
Start: 2018-07-01 | End: 2018-07-01 | Stop reason: HOSPADM

## 2018-07-01 RX ORDER — SODIUM CHLORIDE 0.9 % (FLUSH) 0.9 %
1-10 SYRINGE (ML) INJECTION AS NEEDED
Status: DISCONTINUED | OUTPATIENT
Start: 2018-07-01 | End: 2018-07-01 | Stop reason: HOSPADM

## 2018-07-01 RX ORDER — MEPERIDINE HYDROCHLORIDE 25 MG/ML
12.5 INJECTION INTRAMUSCULAR; INTRAVENOUS; SUBCUTANEOUS
Status: DISCONTINUED | OUTPATIENT
Start: 2018-07-01 | End: 2018-07-01 | Stop reason: HOSPADM

## 2018-07-01 RX ORDER — OXYCODONE AND ACETAMINOPHEN 10; 325 MG/1; MG/1
1 TABLET ORAL EVERY 4 HOURS PRN
Qty: 20 TABLET | Refills: 0
Start: 2018-07-01 | End: 2019-12-10

## 2018-07-01 RX ORDER — ROCURONIUM BROMIDE 10 MG/ML
INJECTION, SOLUTION INTRAVENOUS AS NEEDED
Status: DISCONTINUED | OUTPATIENT
Start: 2018-07-01 | End: 2018-07-01 | Stop reason: SURG

## 2018-07-01 RX ORDER — SODIUM CHLORIDE, SODIUM LACTATE, POTASSIUM CHLORIDE, CALCIUM CHLORIDE 600; 310; 30; 20 MG/100ML; MG/100ML; MG/100ML; MG/100ML
125 INJECTION, SOLUTION INTRAVENOUS CONTINUOUS
Status: DISCONTINUED | OUTPATIENT
Start: 2018-07-01 | End: 2018-07-01

## 2018-07-01 RX ORDER — MAGNESIUM HYDROXIDE 1200 MG/15ML
LIQUID ORAL AS NEEDED
Status: DISCONTINUED | OUTPATIENT
Start: 2018-07-01 | End: 2018-07-01 | Stop reason: HOSPADM

## 2018-07-01 RX ORDER — SODIUM CHLORIDE 9 MG/ML
INJECTION, SOLUTION INTRAVENOUS CONTINUOUS PRN
Status: DISCONTINUED | OUTPATIENT
Start: 2018-07-01 | End: 2018-07-01 | Stop reason: SURG

## 2018-07-01 RX ORDER — IPRATROPIUM BROMIDE AND ALBUTEROL SULFATE 2.5; .5 MG/3ML; MG/3ML
3 SOLUTION RESPIRATORY (INHALATION) ONCE AS NEEDED
Status: DISCONTINUED | OUTPATIENT
Start: 2018-07-01 | End: 2018-07-01 | Stop reason: HOSPADM

## 2018-07-01 RX ORDER — FAMOTIDINE 10 MG/ML
INJECTION, SOLUTION INTRAVENOUS AS NEEDED
Status: DISCONTINUED | OUTPATIENT
Start: 2018-07-01 | End: 2018-07-01 | Stop reason: SURG

## 2018-07-01 RX ORDER — BACTERIOSTATIC SODIUM CHLORIDE 0.9 %
VIAL (ML) INJECTION AS NEEDED
Status: DISCONTINUED | OUTPATIENT
Start: 2018-07-01 | End: 2018-07-01 | Stop reason: HOSPADM

## 2018-07-01 RX ORDER — DEXAMETHASONE SODIUM PHOSPHATE 4 MG/ML
INJECTION, SOLUTION INTRA-ARTICULAR; INTRALESIONAL; INTRAMUSCULAR; INTRAVENOUS; SOFT TISSUE AS NEEDED
Status: DISCONTINUED | OUTPATIENT
Start: 2018-07-01 | End: 2018-07-01 | Stop reason: SURG

## 2018-07-01 RX ADMIN — METOPROLOL TARTRATE 12.5 MG: 25 TABLET, FILM COATED ORAL at 09:51

## 2018-07-01 RX ADMIN — CYCLOPHOSPHAMIDE 50 MG: 50 CAPSULE ORAL at 09:49

## 2018-07-01 RX ADMIN — PROPOFOL 100 MG: 10 INJECTION, EMULSION INTRAVENOUS at 14:20

## 2018-07-01 RX ADMIN — MORPHINE SULFATE 4 MG: 4 INJECTION, SOLUTION INTRAMUSCULAR; INTRAVENOUS at 13:01

## 2018-07-01 RX ADMIN — ITRACONAZOLE 200 MG: 100 CAPSULE ORAL at 09:50

## 2018-07-01 RX ADMIN — SODIUM CHLORIDE 1000 ML: 9 INJECTION, SOLUTION INTRAVENOUS at 18:17

## 2018-07-01 RX ADMIN — ITRACONAZOLE 200 MG: 100 CAPSULE ORAL at 20:50

## 2018-07-01 RX ADMIN — METOPROLOL TARTRATE 12.5 MG: 25 TABLET, FILM COATED ORAL at 20:48

## 2018-07-01 RX ADMIN — BUMETANIDE 2 MG: 1 TABLET ORAL at 09:52

## 2018-07-01 RX ADMIN — BUMETANIDE 2 MG: 1 TABLET ORAL at 20:47

## 2018-07-01 RX ADMIN — FAMOTIDINE 20 MG: 10 INJECTION, SOLUTION INTRAVENOUS at 14:29

## 2018-07-01 RX ADMIN — ROCURONIUM BROMIDE 10 MG: 10 INJECTION INTRAVENOUS at 14:23

## 2018-07-01 RX ADMIN — OFLOXACIN 50000 UNITS: 300 TABLET, COATED ORAL at 09:51

## 2018-07-01 RX ADMIN — TOPIRAMATE 100 MG: 100 TABLET, FILM COATED ORAL at 20:47

## 2018-07-01 RX ADMIN — FENTANYL CITRATE 250 MCG: 50 INJECTION, SOLUTION INTRAMUSCULAR; INTRAVENOUS at 14:20

## 2018-07-01 RX ADMIN — ACETAMINOPHEN 650 MG: 325 TABLET, FILM COATED ORAL at 04:17

## 2018-07-01 RX ADMIN — LEVOTHYROXINE SODIUM 50 MCG: 50 TABLET ORAL at 09:52

## 2018-07-01 RX ADMIN — DEXAMETHASONE SODIUM PHOSPHATE 4 MG: 4 INJECTION, SOLUTION INTRAMUSCULAR; INTRAVENOUS at 14:29

## 2018-07-01 RX ADMIN — Medication 1 TABLET: at 09:51

## 2018-07-01 RX ADMIN — CYCLOPHOSPHAMIDE 50 MG: 50 CAPSULE ORAL at 20:52

## 2018-07-01 RX ADMIN — ONDANSETRON 4 MG: 2 INJECTION INTRAMUSCULAR; INTRAVENOUS at 14:29

## 2018-07-01 RX ADMIN — SUCCINYLCHOLINE CHLORIDE 100 MG: 20 INJECTION, SOLUTION INTRAMUSCULAR; INTRAVENOUS at 14:20

## 2018-07-01 RX ADMIN — PANTOPRAZOLE SODIUM 40 MG: 40 TABLET, DELAYED RELEASE ORAL at 09:50

## 2018-07-01 RX ADMIN — TOPIRAMATE 100 MG: 100 TABLET, FILM COATED ORAL at 09:51

## 2018-07-01 RX ADMIN — GABAPENTIN 300 MG: 300 CAPSULE ORAL at 20:47

## 2018-07-01 RX ADMIN — SODIUM CHLORIDE: 9 INJECTION, SOLUTION INTRAVENOUS at 14:09

## 2018-07-01 RX ADMIN — FAMOTIDINE 20 MG: 20 TABLET, FILM COATED ORAL at 09:52

## 2018-07-01 NOTE — ANESTHESIA POSTPROCEDURE EVALUATION
Patient: Mercedes Che    Procedure Summary     Date:  07/01/18 Room / Location:  [facility] OR 02 /  [facility] OR    Anesthesia Start:  1409 Anesthesia Stop:      Procedure:  INSERTION AND REMOVAL OF NEW TUNNELED DIAYLSIS CATHETER (Right ) Diagnosis:       ESRD (end stage renal disease)      Problem with vascular access      (ESRD (end stage renal disease) [N18.6])      (Problem with vascular access [Z78.9])    Surgeon:  Jose Alberto Urena MD Provider:  Geovanni Zuniga MD    Anesthesia Type:  general ASA Status:  4          Anesthesia Type: general  Last vitals  BP   136/87 (07/01/18 1517)   Temp   97.7 °F (36.5 °C) (07/01/18 1512)   Pulse   100 (07/01/18 1517)   Resp   16 (07/01/18 1517)     SpO2   97 % (07/01/18 1517)     Post Anesthesia Care and Evaluation    Patient location during evaluation: PACU  Patient participation: complete - patient participated  Level of consciousness: awake and alert  Pain score: 1  Pain management: adequate  Airway patency: patent  Anesthetic complications: No anesthetic complications  PONV Status: controlled  Cardiovascular status: acceptable  Respiratory status: acceptable  Hydration status: acceptable

## 2018-07-01 NOTE — NURSING NOTE
Called and spoke to El[facility] RN at [phone]. Ask her about new dialysis catheter that she tried to use this AM. She states that she was not able to get blood return from either port of the dialysis catheter and that it was leaking. She states she has called Dr. Urena and left a message for him and that she had talked with Dr. Bedolla and he was going to try to get in touch with Dr. Urena as well. I will attempt to get in touch with Dr. Urena to make sure he is aware.

## 2018-07-01 NOTE — DISCHARGE SUMMARY
"    [facility] DISCHARGE SUMMARY    Patient Identification:  Name:  Mercedes Che  Age:  47 y.o.  Sex:  female  :  1970  MRN:  [id number]  Visit Number:  [id number]    Date of Admission: 2018  Date of Discharge:  2018     PCP: Whitney Angel, SHAVON    DISCHARGE DIAGNOSIS    Primary:    ESRD on HD  Temporary Dialysis Catheter access lost, resolved    Secondary:    Macrocytic Anemia, on epogen   Hypothyroidism  Histoplasmosis   Neuropathy  GERD  Obesity       CONSULTS   Dr. Bedolla, Nephrology   Dr. Urena, General Surgery     PROCEDURES PERFORMED    Dialysis Catheter access, RCW, x2 placements, 18, 18      HOSPITAL COURSE    Patient is a 47 y.o. female presented to [facility] complaining of loss of temporary dialysis catheter access.  Please see the admitting history and physical for further details. Mrs. Che was admitted on 18. She had been at dialysis and they were unable to access her RCW [id number]. She was sent to the ER for further evaluation, it was found the catheter was not working. She underwent a new temporary access on the day of admission, 18, by Dr. Urena. When the dialysis nurse was attempting to start HD on 18 it would not draw and was leaking. This morning, , 2018 she underwent what is her fifth temporary dialysis access. She does have a fistula in her left arm but is not able to be used. She had missed dialysis Thursday due to an appointment with Dr. Brennan to have her fistula \"worked on\". She is having dialysis today and is being discharged there after with the appropriate vital signs and clinical status. On discharge we have continued with her decreased dose of Gabapentin which is now renal appropriate. She will follow up with her PCP in 1 week. She will continue dialysis on her regular scheduled of Tuesday, Thursday, Saturday and see nephrology while in clinic.       VITAL SIGNS:  Temp:  [97.2 °F (36.2 °C)-98.8 " °F (37.1 °C)] 97.8 °F (36.6 °C)  Heart Rate:  [] 96  Resp:  [16-20] 18  BP: (104-149)/(53-89) 142/88  SpO2:  [95 %-100 %] 96 %  on  Flow (L/min):  [2-4] 2;   Device (Oxygen Therapy): nasal cannula    Body mass index is 42.6 kg/m².  Wt Readings from Last 3 Encounters:   06/30/18 123 kg (272 lb)   12/02/17 97.5 kg (215 lb)       PHYSICAL EXAM:    Constitutional:  Well-developed and well-nourished.  No respiratory distress.      HENT:  Head: Normocephalic and atraumatic.  Mouth:  Moist mucous membranes.    Eyes:  Conjunctivae and EOM are normal.  Pupils are equal, round, and reactive to light.  No scleral icterus.    Neck:  Neck supple.  No JVD present.    Cardiovascular:  Normal rate, regular rhythm and normal heart sounds with no murmur.  Pulmonary/Chest:  No respiratory distress, no wheezes, no crackles, with normal breath sounds and good air movement.  Abdominal:  Soft.  Bowel sounds are normal.  No distension and no tenderness.   Musculoskeletal:  Generalized edema, no tenderness, and no deformity.  No red or swollen joints anywhere.    Neurological:  Alert and oriented to person, place, and time.  No cranial nerve deficit.  No tongue deviation.  No facial droop.  No slurred speech.   Skin: Skin is warm and dry. No rash noted. No pallor.   Psychiatric:  Normal mood and affect.  Behavior is normal.  Judgment and thought content normal.   Peripheral vascular:  strong pulses on all 4 extremities with no clubbing, no cyanosis, and no edema.  Genitourinary:      DISCHARGE DISPOSITION   Stable    DISCHARGE MEDICATIONS:     Discharge Medications      New Medications      Instructions Start Date   gabapentin 300 MG capsule  Commonly known as:  NEURONTIN  Replaces:  gabapentin 800 MG tablet   300 mg, Oral, Nightly      oxyCODONE-acetaminophen  MG per tablet  Commonly known as:  PERCOCET   1 tablet, Oral, Every 4 Hours PRN         Continue These Medications      Instructions Start Date   bumetanide 1 MG  tablet  Commonly known as:  BUMEX   2 mg, Oral, 2 Times Daily      calcium acetate 667 MG capsule  Commonly known as:  PHOSLO   667 mg, Oral, 4 Times Daily      cyclophosphamide 50 MG chemo tablet  Commonly known as:  CYTOXAN   50 mg, Oral, 2 Times Daily      famotidine 10 MG tablet  Commonly known as:  PEPCID   20 mg, Oral, Daily      hydrOXYzine 25 MG capsule  Commonly known as:  VISTARIL   50 mg, Oral, 4 Times Daily      itraconazole 100 MG capsule  Commonly known as:  SPORANOX   200 mg, Oral, 2 Times Daily, Prior to [facility] Admission, Patient was on: Take 200 mg twice daily for the first week of each month and repeat for three months. Filled 06/18/2018. Last took 1st week of Lauren      levothyroxine 50 MCG tablet  Commonly known as:  SYNTHROID, LEVOTHROID   50 mcg, Oral, Daily      methocarbamol 750 MG tablet  Commonly known as:  ROBAXIN   750 mg, Oral, 3 Times Daily      metoprolol tartrate 25 MG tablet  Commonly known as:  LOPRESSOR   12.5 mg, Oral, 2 Times Daily      multivitamin tablet tablet   1 tablet, Oral, Daily      topiramate 100 MG tablet  Commonly known as:  TOPAMAX   100 mg, Oral, 2 Times Daily      vitamin D 59795 units capsule capsule  Commonly known as:  ERGOCALCIFEROL   50,000 Units, Oral, Weekly, Prior to [facility] Admission, Patient was on: Takes on Sundays          Stop These Medications    gabapentin 800 MG tablet  Commonly known as:  NEURONTIN  Replaced by:  gabapentin 300 MG capsule     pantoprazole 40 MG EC tablet  Commonly known as:  PROTONIX            Diet Instructions     Diet: Regular, Renal       Discharge Diet:   Regular  Renal           No future appointments.    Additional Instructions for the Follow-ups that You Need to Schedule     Discharge Follow-up with PCP    As directed      Currently Documented PCP:  SHAVON Chambers  PCP Phone Number:  [phone]    Follow Up Details:  SHAVON Chambers 1 week         Discharge Follow-up with Specified Provider: Nephrology, to see in  dialysis clinic continue [id number]-[id number] schedule    As directed      To:  Nephrology, to see in dialysis clinic continue [id number]-eczaxhoo-[id number] schedule           Follow-up Information     SHAVON Chambers Follow up.    Specialty:  Family Medicine  Why:  SHAVON Chambers 1 week  Contact information:  [address] [place] [id number] [phone]                    TEST  RESULTS PENDING AT DISCHARGE   Order Current Status    Blood Culture - Blood, Preliminary result    Blood Culture - Blood, Preliminary result           CODE STATUS  Code Status and Medical Interventions:   Ordered at: 07/01/18 1552     Level Of Support Discussed With:    Patient     Code Status:    CPR     Medical Interventions (Level of Support Prior to Arrest):    Full       SHAVON Trevino  07/01/18  6:24 PM    Please note that this discharge summary required more than 30 minutes to complete.    Please send a copy of this dictation to the following providers:  SHAVON Chambers

## 2018-07-01 NOTE — PROGRESS NOTES
Patient Identification:  Name:  Mercedes Che  Age:  47 y.o.  Sex:  female  :  1970  MRN:  [id number]  Visit Number:  [id number]  Primary Care Provider:  SHAVON Chambers    Length of stay:  0    Subjective:      Mrs. Che is a 47 year old female who was admitted on 18 for her temporary dialysis access not working. It was replaced yesterday (18) by Dr. Urena, however, her new access is leaking, the dialysis nurse was here this morning and was not able to get the port to draw blood and it continued to leak. FLACA Samaniego (dialsyis nurse) also notified nephrology of no access for HD. Dr. Urena is aware, patient is NPO will await his arrival to fix catheter and go forth with HD as directed by nephrology. She is lying in bed, no distress noted, she denies nausea, vomiting, no abdominal pain, no pain at dialysis access site. Spoke with patients nurse Long.   ----------------------------------------------------------------------------------------------------------------------  Current Hospital Meds:    [MAR Hold] bumetanide 2 mg Oral BID   [MAR Hold] calcium acetate 667 mg Oral 4x Daily   [MAR Hold] cholecalciferol 50,000 Units Oral Weekly   [MAR Hold] cyclophosphamide 50 mg Oral BID   famotidine 20 mg Oral Daily   gabapentin 300 mg Oral Nightly   [MAR Hold] heparin (porcine) 5,000 Units Subcutaneous Q12H   [MAR Hold] hydrOXYzine 50 mg Oral Q6H   [MAR Hold] itraconazole 200 mg Oral BID   [MAR Hold] levothyroxine 50 mcg Oral Daily   metoprolol tartrate 12.5 mg Oral Q12H   [MAR Hold] multivitamin 1 tablet Oral Daily   [MAR Hold] pantoprazole 40 mg Oral Daily   topiramate 100 mg Oral Q12H       lactated ringers 125 mL/hr     ----------------------------------------------------------------------------------------------------------------------  Vital Signs:  Temp:  [97.2 °F (36.2 °C)-98.6 °F (37 °C)] 97.7 °F (36.5 °C)  Heart Rate:  [] 97  Resp:  [16-20] 20  BP: (100-149)/(53-88) 112/53  1     06/30/18  0828 06/30/18  1120   Weight: 109 kg (240 lb) 123 kg (272 lb)     Body mass index is 42.6 kg/m².    Intake/Output Summary (Last 24 hours) at 07/01/18 0758  Last data filed at 07/01/18 0300   Gross per 24 hour   Intake              950 ml   Output              100 ml   Net              850 ml     No intake/output data recorded.  Diet Regular; Consistent Carbohydrate, Cardiac  ----------------------------------------------------------------------------------------------------------------------  Physical exam:  Constitutional:  Well-developed and well-nourished.  No respiratory distress.      HENT:  Head:  Normocephalic and atraumatic.  Mouth:  Moist mucous membranes.    Eyes:  Conjunctivae and EOM are normal.  Pupils are equal, round, and reactive to light.  No scleral icterus.    Neck:  Neck supple.  No JVD present.    Cardiovascular:  Normal rate, regular rhythm and normal heart sounds with no murmur.  Pulmonary/Chest:  No respiratory distress, no wheezes, no crackles, with normal breath sounds and good air movement.  Abdominal:  Soft.  Bowel sounds are normal.  No distension and no tenderness.   Musculoskeletal:  Generalized edema, no tenderness, and no deformity.  No red or swollen joints anywhere.    Neurological:  Alert and oriented to person, place, and time.  No cranial nerve deficit.  No tongue deviation.  No facial droop.  No slurred speech.   Skin:  Skin is warm and dry. No rash noted. No pallor. Fistula left arm   ----------------------------------------------------------------------------------------------------------------------  Tele:    ----------------------------------------------------------------------------------------------------------------------    Results from last 7 days  Lab Units 06/30/18 0915   TROPONIN I ng/mL 0.009       Results from last 7 days  Lab Units 07/01/18 0428 06/30/18 0915   WBC 10*3/mm3 9.67 8.40   HEMOGLOBIN g/dL 8.8* 9.6*   HEMATOCRIT % 27.6* 29.9*   MCV fL  102.6* 104.2*   MCHC g/dL 31.9* 32.1*   PLATELETS 10*3/mm3 203 232   INR   --  1.03           Results from last 7 days  Lab Units 07/01/18  0428 06/30/18  0915   SODIUM mmol/L 137 143   POTASSIUM mmol/L 4.3 4.2   MAGNESIUM mg/dL 2.0 2.0   CHLORIDE mmol/L 109 111   CO2 mmol/L 22.1* 25.8   BUN mg/dL 37* 40*   CREATININE mg/dL 4.71* 4.45*   EGFR IF NONAFRICN AM mL/min/1.73 10* 11*   CALCIUM mg/dL 9.2 9.2   GLUCOSE mg/dL 100 95   ALBUMIN g/dL 3.70 4.00   BILIRUBIN mg/dL 0.2 0.2   ALK PHOS U/L 66 78   AST (SGOT) U/L 18 20   ALT (SGPT) U/L 12 14   Estimated Creatinine Clearance: 19.9 mL/min (A) (by C-G formula based on SCr of 4.71 mg/dL (H)).  No results found for: AMMONIA      Blood Culture   Date Value Ref Range Status   06/30/2018 No growth at less than 24 hours  Preliminary   06/30/2018 No growth at less than 24 hours  Preliminary              ----------------------------------------------------------------------------------------------------------------------  Imaging Results (last 24 hours)     Procedure Component Value Units Date/Time    FL Surgery Fluoro [[id number]] Updated:  06/30/18 1910    XR Chest 1 View [[id number]] Collected:  06/30/18 0945     Updated:  06/30/18 0948    Narrative:       EXAMINATION: XR CHEST 1 VW-      CLINICAL INDICATION:     soa     TECHNIQUE:  XR CHEST 1 VW-      COMPARISON: 04/26/2013      FINDINGS:   Right tunneled dialysis catheter is noted.  Lungs are aerated.   Heart size, mediastinum, and pulmonary vascularity are stable from  previous.   No pneumothorax.   No effusions.   Stable appearance of the bony structures.            Impression:       No acute cardiopulmonary findings.     This report was finalized on 6/30/2018 9:46 AM by Dr. Flaquito Cook MD.           ----------------------------------------------------------------------------------------------------------------------  Assessment and Plan:    ESRD on HD  Loss of temporary HD access  Essential HTN    Hypothyroidism  Macrocytic anemia  Obesity  Hx of histoplasmosis      ESRD on HD, Loss of temporary HD access: temporary access that was placed yesterday is leaking and not able to draw blood, she is NPO to have this fixed again today. Nephrology and General surgery aware. Potassium is 4.3, creatinine is 4.71, BUN is 37, will monitor closely.      Hypothyroidism: continue PO synthroid, no concerns.       Macrocytic anemia: Vit B12 is normal at 614, and folate is normal at 16.26. Receives epogen as an outpatient. H/H this morning is 8.8/27.6 from 9.6/29.9 yesterday, likely dilutional from fluid overload, repeat in the AM, no s/s of bleeding.      Essential HTN: BP controlled 104//80 no concerns currently.     Hx of Histoplasmosis: continue sporanox (she states will take from 3 years and started in November 2017).      DVT prophylaxis: SQ heparin      The patient is high risk due to the following diagnoses/reasons:  ESRD on HD     SHAVON Trevino  07/01/18  7:58 AM

## 2018-07-01 NOTE — PLAN OF CARE
Problem: Patient Care Overview  Goal: Plan of Care Review   07/01/18 0408   Plan of Care Review   Progress no change   Coping/Psychosocial   Plan of Care Reviewed With patient

## 2018-07-01 NOTE — OP NOTE
Mercedes Che  6/30/2018 - 7/1/2018      Operative Progress Note:    Surgeon and Assistant: Dr. Urena    Pre-Operative Diagnosis: leaking tunneled dialysis catheter    Post-Operative Diagnosis: leaking tunneled dialysis catheter    Procedure(s):  removal and replacement of tunneled dialysis catheter    Type of Anesthesia Administered: IV general    Estimated Blood Loss: Minimal    Blood Products: None    Specimen Obtained/Removed:None    Complication(s):  None    Graft/Implant/Prosthetics/Implanted Device/Transplants: None    Indication: patient's 47-year-old white female    Findings: leaking of tunneled dialysis catheter at the most distal connection.    Operative Report:  Patient taken operating room and laid in the spine position on operating table.  IV sedation was given after the neck and chest are prepped draped usual sterile fashion.  Irrigated the catheter and see that there was a leak at the connection on the most distal aspect of the catheter.  I opened the incision at the neck and cut the catheter and placed a guidewire down into the superior vena cava.  I then placed a new catheter over the guidewire and tunneled it appropriately.  Fluoroscopic guidance was used.  Catheter irrigated very nicely with copious amounts of saline.  There is excellent back flow of blood.  Heparinized saline was placed in each port.  Incision at the neck was closed subsequent for Monocryl.  Catheter was secured with 2-0 silk.  Sterile dressing applied.  Procedure terminated.  Patient thought procedure very well was returned recovery room satisfactory condition.       Electronically Signed by: Jose Alberto Urena MD        Dictated Utilizing Dragon Dictation

## 2018-07-01 NOTE — PLAN OF CARE
Problem: Patient Care Overview  Goal: Plan of Care Review  Outcome: Ongoing (interventions implemented as appropriate)    Goal: Individualization and Mutuality  Outcome: Ongoing (interventions implemented as appropriate)      Problem: Fall Risk (Adult)  Goal: Identify Related Risk Factors and Signs and Symptoms  Outcome: Outcome(s) achieved Date Met: 07/01/18    Goal: Absence of Fall  Outcome: Ongoing (interventions implemented as appropriate)      Problem: Infection, Risk/Actual (Adult)  Goal: Identify Related Risk Factors and Signs and Symptoms  Outcome: Outcome(s) achieved Date Met: 07/01/18    Goal: Infection Prevention/Resolution  Outcome: Ongoing (interventions implemented as appropriate)

## 2018-07-01 NOTE — NURSING NOTE
Dialysis catheter found to be leaking. Catheter dressing and patient's gown are wet. Dr. Urena notified of catheter leaking. Advised to check connections and/or insert a new IV. 22g IV placed in right hand for vanco infusion. Dressing changed on dialysis catheter insertion site.

## 2018-07-01 NOTE — PROGRESS NOTES
Nephrology Progress Note      Subjective     Vision had a right internal jugular vein tunneled catheter placement by Dr. Urena has a lot of pain in that area and the dialysis nurses tried to dialyze and she was some leak around the house    Objective       Vital signs :     Temp:  [97.2 °F (36.2 °C)-98.6 °F (37 °C)] 97.7 °F (36.5 °C)  Heart Rate:  [] 97  Resp:  [16-20] 20  BP: (100-149)/(53-80) 112/53    I/O last 3 completed shifts:  In: 950 [P.O.:900; I.V.:50]  Out: 100 [Urine:100]  No intake/output data recorded.    Physical Exam:    General Appearance : alert, pleasant, appears stated age, cooperative   Head  :  normocephalic, without obvious abnormality and atraumatic  Eyes  :  conjunctivae and sclerae normal, no icterus, no pallor and PERRLA  Neck  :  no adenopathy, suppple, no carotid bruit, no JVD   Lungs : clear to auscultation, respirations regular  Heart :  regular rhythm & normal rate, normal S1, S2 and no murmur, no rub  Abdomen : normal bowel sounds, no masses, no hepatomegaly, no splenomegaly, soft non-tender and no guarding  Extremities : moves extremities well, 2+ edema, no cyanosis and no redness  Skin :  no bleeding, bruising or rash  Neurologic :   orientated to person, place, time and situation, Grossly no focal deficits  Acess : Left upper arm AV fistula which has a good thrill and bruit but very deep also patient has a right internal jugular vein tunneled catheter now    Laboratory Data :       WBC WBC   Date Value Ref Range Status   07/01/2018 9.67 4.50 - 12.50 10*3/mm3 Final   06/30/2018 8.40 4.50 - 12.50 10*3/mm3 Final      HGB Hemoglobin   Date Value Ref Range Status   07/01/2018 8.8 (L) 12.0 - 16.0 g/dL Final   06/30/2018 9.6 (L) 12.0 - 16.0 g/dL Final      HCT Hematocrit   Date Value Ref Range Status   07/01/2018 27.6 (L) 37.0 - 47.0 % Final   06/30/2018 29.9 (L) 37.0 - 47.0 % Final      Platlets No results found for: LABPLAT   MCV MCV   Date Value Ref Range Status   07/01/2018  102.6 (H) 80.0 - 94.0 fL Final   06/30/2018 104.2 (H) 80.0 - 94.0 fL Final          Sodium Sodium   Date Value Ref Range Status   07/01/2018 137 135 - 153 mmol/L Final   06/30/2018 143 135 - 153 mmol/L Final      Potassium Potassium   Date Value Ref Range Status   07/01/2018 4.3 3.5 - 5.3 mmol/L Final   06/30/2018 4.2 3.5 - 5.3 mmol/L Final      Chloride Chloride   Date Value Ref Range Status   07/01/2018 109 99 - 112 mmol/L Final   06/30/2018 111 99 - 112 mmol/L Final      CO2 CO2   Date Value Ref Range Status   07/01/2018 22.1 (L) 24.3 - 31.9 mmol/L Final   06/30/2018 25.8 24.3 - 31.9 mmol/L Final      BUN BUN   Date Value Ref Range Status   07/01/2018 37 (H) 7 - 21 mg/dL Final   06/30/2018 40 (H) 7 - 21 mg/dL Final      Creatinine Creatinine   Date Value Ref Range Status   07/01/2018 4.71 (H) 0.43 - 1.29 mg/dL Final   06/30/2018 4.45 (H) 0.43 - 1.29 mg/dL Final      Calcium Calcium   Date Value Ref Range Status   07/01/2018 9.2 7.7 - 10.0 mg/dL Final   06/30/2018 9.2 7.7 - 10.0 mg/dL Final      PO4 No results found for: CAPO4   Albumin Albumin   Date Value Ref Range Status   07/01/2018 3.70 3.50 - 5.00 g/dL Final   06/30/2018 4.00 3.50 - 5.00 g/dL Final      Magnesium Magnesium   Date Value Ref Range Status   07/01/2018 2.0 1.7 - 2.6 mg/dL Final   06/30/2018 2.0 1.7 - 2.6 mg/dL Final          PTH               No results found for: PTH      Medications :       bumetanide 2 mg Oral BID   calcium acetate 667 mg Oral 4x Daily   cholecalciferol 50,000 Units Oral Weekly   cyclophosphamide 50 mg Oral BID   famotidine 20 mg Oral Daily   gabapentin 300 mg Oral Nightly   heparin (porcine) 5,000 Units Subcutaneous Q12H   hydrOXYzine 50 mg Oral Q6H   itraconazole 200 mg Oral BID   levothyroxine 50 mcg Oral Daily   metoprolol tartrate 12.5 mg Oral Q12H   multivitamin 1 tablet Oral Daily   pantoprazole 40 mg Oral Daily   topiramate 100 mg Oral Q12H            Assessment/Plan     1. ESRD : Dialysis nurses tried to do dialysis  "with a catheter but she'll not able as there was no flow for one port also there was some leak around the hub Dr. Urena has been asked to see the patient again will decide about dialysis after she is seen by     2. Anemia : Hemoglobin is 8.8 troponin 9.6 with AV.    3. Bone Mineral disorder : Outpatient    4. HTN : Stable    5.  Complications of access; and had a right internal jugular vein tunneled catheter placement by Dr. Urena the dialysis nurse tried to run the patient with that but there was a leak and was not able to run Dr. Urena exceeding the patient\" and looked at the chest x-ray do not see any kink maybe there is edema    Plan of care Discussed the patients she understood verbalize agreed      Discussed with patient, Dr. Romel Bedolla MD  07/01/18  10:12 AM    "

## 2018-07-02 ENCOUNTER — TELEPHONE (OUTPATIENT)
Dept: MEDSURG UNIT | Facility: HOSPITAL | Age: 48
End: 2018-07-02

## 2018-07-05 LAB
BACTERIA SPEC AEROBE CULT: NORMAL
BACTERIA SPEC AEROBE CULT: NORMAL

## 2018-07-05 NOTE — OP NOTE
Mercedes Che  6/30/2018 - 7/1/2018      Operative Progress Note:    Surgeon and Assistant: Dr. Urena    Pre-Operative Diagnosis: malfunctioning tunneled dialysis catheter    Post-Operative Diagnosis: now functioning tunneled dialysis catheter    Procedure(s):  removal of tunneled dialysis catheter and replacement of new tunneled dialysis catheter    Type of Anesthesia Administered: IV general    Estimated Blood Loss: Minimal    Blood Products: None    Specimen Obtained/Removed:None    Complication(s):  None    Graft/Implant/Prosthetics/Implanted Device/Transplants: 27 cm Mederos catheter    Indication: patient's 47-year-old white female    Findings: functioning tunneled right IJ dialysis catheter    Operative Report:  Patient taken operating room and laid in the spine position on operating table.  IV sedation was given after the neck and chest are prepped draped usual sterile fashion.     I opened the incision at the neck and cut the catheter and placed a guidewire down into the superior vena cava.  I then placed a new catheter over the guidewire and tunneled it appropriately.  Fluoroscopic guidance was used.  Catheter irrigated very nicely with copious amounts of saline.  There is excellent back flow of blood.  Heparinized saline was placed in each port.  Incision at the neck was closed subsequent for Monocryl.  Catheter was secured with 2-0 silk.  Sterile dressing applied.  Procedure terminated.  Patient tolerated procedure very well was returned to recovery room in satisfactory condition.          Electronically Signed by: Jose Alberto Urena MD        Dictated Utilizing Dragon Dictation

## 2018-07-24 ENCOUNTER — HOSPITAL ENCOUNTER (EMERGENCY)
Facility: HOSPITAL | Age: 48
Discharge: HOME | End: 2018-07-24
Attending: EMERGENCY MEDICINE | Admitting: EMERGENCY MEDICINE

## 2018-07-24 ENCOUNTER — APPOINTMENT (OUTPATIENT)
Dept: GENERAL RADIOLOGY | Facility: HOSPITAL | Age: 48
End: 2018-07-24

## 2018-07-24 VITALS
WEIGHT: 263 LBS | OXYGEN SATURATION: 95 % | HEIGHT: 67 IN | RESPIRATION RATE: 20 BRPM | SYSTOLIC BLOOD PRESSURE: 125 MMHG | BODY MASS INDEX: 41.28 KG/M2 | DIASTOLIC BLOOD PRESSURE: 59 MMHG | HEART RATE: 98 BPM | TEMPERATURE: 97.9 F

## 2018-07-24 DIAGNOSIS — J40 BRONCHITIS: Primary | ICD-10-CM

## 2018-07-24 LAB
A-A DO2: 31.1 MMHG (ref 0–300)
ALBUMIN SERPL-MCNC: 4.5 G/DL (ref 3.5–5)
ALBUMIN/GLOB SERPL: 1.6 G/DL (ref 1.5–2.5)
ALP SERPL-CCNC: 80 U/L (ref 35–104)
ALT SERPL W P-5'-P-CCNC: 6 U/L (ref 10–36)
ANION GAP SERPL CALCULATED.3IONS-SCNC: 7.2 MMOL/L (ref 3.6–11.2)
ARTERIAL PATENCY WRIST A: POSITIVE
AST SERPL-CCNC: 30 U/L (ref 10–30)
ATMOSPHERIC PRESS: 726 MMHG
BASE EXCESS BLDA CALC-SCNC: 4.1 MMOL/L
BASOPHILS # BLD AUTO: 0.03 10*3/MM3 (ref 0–0.3)
BASOPHILS NFR BLD AUTO: 0.3 % (ref 0–2)
BDY SITE: ABNORMAL
BILIRUB SERPL-MCNC: 0.2 MG/DL (ref 0.2–1.8)
BNP SERPL-MCNC: 56 PG/ML (ref 0–100)
BODY TEMPERATURE: 98.6 C
BUN BLD-MCNC: 20 MG/DL (ref 7–21)
BUN/CREAT SERPL: 7.4 (ref 7–25)
CALCIUM SPEC-SCNC: 9.7 MG/DL (ref 7.7–10)
CHLORIDE SERPL-SCNC: 101 MMOL/L (ref 99–112)
CO2 SERPL-SCNC: 29.8 MMOL/L (ref 24.3–31.9)
COHGB MFR BLD: 5.2 % (ref 0–5)
CREAT BLD-MCNC: 2.72 MG/DL (ref 0.43–1.29)
DEPRECATED RDW RBC AUTO: 62.5 FL (ref 37–54)
EOSINOPHIL # BLD AUTO: 0.53 10*3/MM3 (ref 0–0.7)
EOSINOPHIL NFR BLD AUTO: 5.8 % (ref 0–5)
ERYTHROCYTE [DISTWIDTH] IN BLOOD BY AUTOMATED COUNT: 17.1 % (ref 11.5–14.5)
GFR SERPL CREATININE-BSD FRML MDRD: 19 ML/MIN/1.73
GLOBULIN UR ELPH-MCNC: 2.8 GM/DL
GLUCOSE BLD-MCNC: 105 MG/DL (ref 70–110)
HCO3 BLDA-SCNC: 27.7 MMOL/L (ref 22–26)
HCT VFR BLD AUTO: 29.5 % (ref 37–47)
HCT VFR BLD CALC: 31 % (ref 37–47)
HGB BLD-MCNC: 9.4 G/DL (ref 12–16)
HGB BLDA-MCNC: 10.5 G/DL (ref 12–16)
HOLD SPECIMEN: NORMAL
HOLD SPECIMEN: NORMAL
HOROWITZ INDEX BLD+IHG-RTO: 21 %
IMM GRANULOCYTES # BLD: 0.07 10*3/MM3 (ref 0–0.03)
IMM GRANULOCYTES NFR BLD: 0.8 % (ref 0–0.5)
LYMPHOCYTES # BLD AUTO: 1.14 10*3/MM3 (ref 1–3)
LYMPHOCYTES NFR BLD AUTO: 12.5 % (ref 21–51)
MCH RBC QN AUTO: 33.1 PG (ref 27–33)
MCHC RBC AUTO-ENTMCNC: 31.9 G/DL (ref 33–37)
MCV RBC AUTO: 103.9 FL (ref 80–94)
METHGB BLD QL: 0 % (ref 0–3)
MODALITY: ABNORMAL
MONOCYTES # BLD AUTO: 0.65 10*3/MM3 (ref 0.1–0.9)
MONOCYTES NFR BLD AUTO: 7.2 % (ref 0–10)
NEUTROPHILS # BLD AUTO: 6.67 10*3/MM3 (ref 1.4–6.5)
NEUTROPHILS NFR BLD AUTO: 73.4 % (ref 30–70)
OSMOLALITY SERPL CALC.SUM OF ELEC: 278.7 MOSM/KG (ref 273–305)
OXYHGB MFR BLDV: 88.3 % (ref 85–100)
PCO2 BLDA: 38.1 MM HG (ref 35–45)
PH BLDA: 7.48 PH UNITS (ref 7.35–7.45)
PLATELET # BLD AUTO: 239 10*3/MM3 (ref 130–400)
PMV BLD AUTO: 8.7 FL (ref 6–10)
PO2 BLDA: 65.9 MM HG (ref 80–100)
POTASSIUM BLD-SCNC: 4.3 MMOL/L (ref 3.5–5.3)
PROT SERPL-MCNC: 7.3 G/DL (ref 6–8)
RBC # BLD AUTO: 2.84 10*6/MM3 (ref 4.2–5.4)
SAO2 % BLDCOA: 93.1 % (ref 90–100)
SODIUM BLD-SCNC: 138 MMOL/L (ref 135–153)
TROPONIN I SERPL-MCNC: <0.006 NG/ML
WBC NRBC COR # BLD: 9.09 10*3/MM3 (ref 4.5–12.5)
WHOLE BLOOD HOLD SPECIMEN: NORMAL
WHOLE BLOOD HOLD SPECIMEN: NORMAL

## 2018-07-24 PROCEDURE — 82375 ASSAY CARBOXYHB QUANT: CPT | Performed by: EMERGENCY MEDICINE

## 2018-07-24 PROCEDURE — 93005 ELECTROCARDIOGRAM TRACING: CPT | Performed by: INTERNAL MEDICINE

## 2018-07-24 PROCEDURE — 83050 HGB METHEMOGLOBIN QUAN: CPT | Performed by: EMERGENCY MEDICINE

## 2018-07-24 PROCEDURE — 71046 X-RAY EXAM CHEST 2 VIEWS: CPT | Performed by: RADIOLOGY

## 2018-07-24 PROCEDURE — 93010 ELECTROCARDIOGRAM REPORT: CPT | Performed by: INTERNAL MEDICINE

## 2018-07-24 PROCEDURE — 71046 X-RAY EXAM CHEST 2 VIEWS: CPT

## 2018-07-24 PROCEDURE — 99284 EMERGENCY DEPT VISIT MOD MDM: CPT

## 2018-07-24 PROCEDURE — 94640 AIRWAY INHALATION TREATMENT: CPT

## 2018-07-24 PROCEDURE — 85025 COMPLETE CBC W/AUTO DIFF WBC: CPT | Performed by: INTERNAL MEDICINE

## 2018-07-24 PROCEDURE — 36600 WITHDRAWAL OF ARTERIAL BLOOD: CPT | Performed by: EMERGENCY MEDICINE

## 2018-07-24 PROCEDURE — 94799 UNLISTED PULMONARY SVC/PX: CPT

## 2018-07-24 PROCEDURE — 83880 ASSAY OF NATRIURETIC PEPTIDE: CPT | Performed by: INTERNAL MEDICINE

## 2018-07-24 PROCEDURE — 82805 BLOOD GASES W/O2 SATURATION: CPT | Performed by: EMERGENCY MEDICINE

## 2018-07-24 PROCEDURE — 80053 COMPREHEN METABOLIC PANEL: CPT | Performed by: INTERNAL MEDICINE

## 2018-07-24 PROCEDURE — 84484 ASSAY OF TROPONIN QUANT: CPT | Performed by: INTERNAL MEDICINE

## 2018-07-24 RX ORDER — GUAIFENESIN AND CODEINE PHOSPHATE 100; 10 MG/5ML; MG/5ML
10 SOLUTION ORAL 4 TIMES DAILY PRN
Qty: 118 ML | Refills: 0 | Status: SHIPPED | OUTPATIENT
Start: 2018-07-24 | End: 2019-12-10

## 2018-07-24 RX ORDER — AZITHROMYCIN 250 MG/1
TABLET, FILM COATED ORAL
Qty: 6 TABLET | Refills: 0 | Status: SHIPPED | OUTPATIENT
Start: 2018-07-24 | End: 2019-12-10

## 2018-07-24 RX ORDER — ALBUTEROL SULFATE 90 UG/1
2 AEROSOL, METERED RESPIRATORY (INHALATION) EVERY 4 HOURS PRN
Qty: 1 INHALER | Refills: 0 | Status: SHIPPED | OUTPATIENT
Start: 2018-07-24 | End: 2021-02-10

## 2018-07-24 RX ORDER — IPRATROPIUM BROMIDE AND ALBUTEROL SULFATE 2.5; .5 MG/3ML; MG/3ML
3 SOLUTION RESPIRATORY (INHALATION) ONCE
Status: COMPLETED | OUTPATIENT
Start: 2018-07-24 | End: 2018-07-24

## 2018-07-24 RX ORDER — SODIUM CHLORIDE 0.9 % (FLUSH) 0.9 %
10 SYRINGE (ML) INJECTION AS NEEDED
Status: DISCONTINUED | OUTPATIENT
Start: 2018-07-24 | End: 2018-07-24 | Stop reason: HOSPADM

## 2018-07-24 RX ADMIN — IPRATROPIUM BROMIDE AND ALBUTEROL SULFATE 3 ML: .5; 3 SOLUTION RESPIRATORY (INHALATION) at 14:44

## 2019-12-10 ENCOUNTER — OFFICE VISIT (OUTPATIENT)
Dept: UROLOGY | Facility: CLINIC | Age: 49
End: 2019-12-10

## 2019-12-10 VITALS — HEIGHT: 65 IN | BODY MASS INDEX: 46.48 KG/M2 | WEIGHT: 279 LBS

## 2019-12-10 DIAGNOSIS — R31.0 GROSS HEMATURIA: Primary | ICD-10-CM

## 2019-12-10 PROCEDURE — 99203 OFFICE O/P NEW LOW 30 MIN: CPT | Performed by: UROLOGY

## 2019-12-10 RX ORDER — ATENOLOL 25 MG/1
25 TABLET ORAL DAILY
Refills: 0 | COMMUNITY
Start: 2019-11-25

## 2019-12-10 RX ORDER — ONDANSETRON 4 MG/1
TABLET, FILM COATED ORAL
Refills: 0 | COMMUNITY
Start: 2019-11-25 | End: 2021-02-10

## 2019-12-10 RX ORDER — BUPROPION HYDROCHLORIDE 150 MG/1
TABLET ORAL
Refills: 0 | COMMUNITY
Start: 2019-10-24 | End: 2019-12-10

## 2019-12-10 RX ORDER — HYDROCODONE BITARTRATE AND ACETAMINOPHEN 5; 325 MG/1; MG/1
TABLET ORAL
Refills: 0 | COMMUNITY
Start: 2019-10-31 | End: 2019-12-10

## 2019-12-10 RX ORDER — HYDROXYZINE PAMOATE 50 MG/1
CAPSULE ORAL
Refills: 0 | COMMUNITY
Start: 2019-11-25 | End: 2019-12-10

## 2019-12-10 RX ORDER — GABAPENTIN 800 MG/1
800 TABLET ORAL 3 TIMES DAILY
Refills: 0 | Status: ON HOLD | COMMUNITY
Start: 2019-11-25 | End: 2021-02-16 | Stop reason: SDUPTHER

## 2019-12-10 NOTE — PROGRESS NOTES
Chief Complaint:          Chief Complaint   Patient presents with   • Difficulty Urinating     New pt       HPI:   49 y.o. female referred for persistent dysuria.  She has been on dialysis since 2017 as a result of histoplasmosis, Goodpasture's disease and multiple antibiotics.  She brought a disc with that was negative upper tracts with a personally reviewed by myself.  She has had a recent urinary tract infection does not make a great deal urine has had multiple rounds of different antibiotics all unsuccessful.  Has been seen in the [facility].  She is been to [place] as well for 3 different antibiotics.  She is a  3 para 3 daily bowel movements minimal stress leakage.  Allergies to Lasix no other significant problems no relationship to intercourse and the significant vaginal discharge I am when I recommend an upper and lower tract investigation.      Past Medical History:        Past Medical History:   Diagnosis Date   • Dialysis patient (CMS/[facility])    • Disease of thyroid gland    • Histoplasmosis    • Hypertension    • Renal disorder          Current Meds:     Current Outpatient Medications   Medication Sig Dispense Refill   • albuterol (PROVENTIL HFA;VENTOLIN HFA) 108 (90 Base) MCG/ACT inhaler Inhale 2 puffs Every 4 (Four) Hours As Needed for Wheezing or Shortness of Air. 1 inhaler 0   • atenolol (TENORMIN) 25 MG tablet TK 2 TABLETS PO BID  0   • gabapentin (NEURONTIN) 800 MG tablet TK 1 T PO  TID  0   • itraconazole (SPORANOX) 100 MG capsule Take 200 mg by mouth 2 (Two) Times a Day. Prior to [facility] Admission, Patient was on: Take 200 mg twice daily for the first week of each month and repeat for three months. Filled 2018. Last took 1st week of Lauren     • levothyroxine (SYNTHROID, LEVOTHROID) 50 MCG tablet Take 50 mcg by mouth Daily.     • methocarbamol (ROBAXIN) 750 MG tablet Take 750 mg by mouth 3 (Three) Times a Day.     • multivitamin (DAILY JOSE) tablet tablet Take 1 tablet by  mouth Daily.     • ondansetron (ZOFRAN) 4 MG tablet TK 2 TABLETS PO Q 6 HOURS PRN FOR NAUSEA  0   • VOLTAREN 1 % gel gel APPLY 2 GRAMS TO THE AFFECTED AREAS TOPICALLY QID  0     No current facility-administered medications for this visit.         Allergies:      Allergies   Allergen Reactions   • Latex Hives        Past Surgical History:     Past Surgical History:   Procedure Laterality Date   • ARTERIOVENOUS FISTULA     •  SECTION     • LUNG BIOPSY     • RENAL BIOPSY     • TUBAL ABDOMINAL LIGATION     • VENOUS ACCESS DEVICE (PORT) INSERTION AND REMOVAL Right 2018    Procedure: INSERTION AND REMOVAL OF NEW TUNNELED DIAYLSIS CATHETER;  Surgeon: Jose Alberto Urena MD;  Location: [facility];  Service: General   • VENOUS ACCESS DEVICE (PORT) INSERTION AND REMOVAL N/A 2018    Procedure: REMOVAL AND INSERTION OF TUNNELED DIAYLSIS CATHETER;  Surgeon: Jose Alberto Urena MD;  Location: [facility];  Service: General         Social History:     Social History     Socioeconomic History   • Marital status:      Spouse name: Not on file   • Number of children: Not on file   • Years of education: Not on file   • Highest education level: Not on file   Tobacco Use   • Smoking status: Current Every Day Smoker     Packs/day: 0.50     Types: Cigarettes   • Smokeless tobacco: Never Used   Substance and Sexual Activity   • Alcohol use: No   • Drug use: No   • Sexual activity: Never       Family History:     Family History   Problem Relation Age of Onset   • No Known Problems Father    • No Known Problems Mother        Review of Systems:     Review of Systems   Constitutional: Negative.  Negative for activity change, appetite change, chills, diaphoresis, fatigue and unexpected weight change.   HENT: Negative for congestion, dental problem, drooling, ear discharge, ear pain, facial swelling, hearing loss, mouth sores, nosebleeds, postnasal drip, rhinorrhea, sinus pressure, sneezing, sore throat, tinnitus, trouble swallowing  and voice change.    Eyes: Negative.  Negative for photophobia, pain, discharge, redness, itching and visual disturbance.   Respiratory: Negative.  Negative for apnea, cough, choking, chest tightness, shortness of breath, wheezing and stridor.    Cardiovascular: Negative.  Negative for chest pain, palpitations and leg swelling.   Gastrointestinal: Negative.  Negative for abdominal distention, abdominal pain, anal bleeding, blood in stool, constipation, diarrhea, nausea, rectal pain and vomiting.   Endocrine: Negative.  Negative for cold intolerance, heat intolerance, polydipsia, polyphagia and polyuria.   Genitourinary: Positive for hematuria.   Musculoskeletal: Negative.  Negative for arthralgias, back pain, gait problem, joint swelling, myalgias, neck pain and neck stiffness.   Skin: Negative.  Negative for color change, pallor, rash and wound.   Allergic/Immunologic: Negative.  Negative for environmental allergies, food allergies and immunocompromised state.   Neurological: Negative.  Negative for dizziness, tremors, seizures, syncope, facial asymmetry, speech difficulty, weakness, light-headedness, numbness and headaches.   Hematological: Negative.  Negative for adenopathy. Does not bruise/bleed easily.   Psychiatric/Behavioral: Negative for agitation, behavioral problems, confusion, decreased concentration, dysphoric mood, hallucinations, self-injury, sleep disturbance and suicidal ideas. The patient is not nervous/anxious and is not hyperactive.    All other systems reviewed and are negative.      Physical Exam:     Physical Exam   Constitutional: She appears well-developed and well-nourished.   HENT:   Head: Normocephalic and atraumatic.   Right Ear: External ear normal.   Left Ear: External ear normal.   Mouth/Throat: Oropharynx is clear and moist.   Eyes: Pupils are equal, round, and reactive to light. Conjunctivae are normal.   Cardiovascular: Normal rate, regular rhythm, normal heart sounds and intact  distal pulses.   Pulmonary/Chest: Effort normal and breath sounds normal.   Abdominal: Soft. Bowel sounds are normal. She exhibits no distension and no mass. There is no tenderness. There is no rebound and no guarding.   Genitourinary: Vagina normal. No vaginal discharge found.   Musculoskeletal: Normal range of motion.   Neurological: She is alert. She has normal reflexes.   Skin: Skin is warm and dry.   Psychiatric: She has a normal mood and affect. Her behavior is normal. Judgment and thought content normal.       I have reviewed the following portions of the patient's history: allergies, current medications, past family history, past medical history, past social history, past surgical history, problem list and ROS and confirm it's accurate.      Procedure:       Assessment/Plan:   Hematuria-patient was diagnosed with hematuria.  We discussed the significance of microscopic hematuria versus gross hematuria.  We discussed the presence or absence of the type of clotting identified including vermiform clots consistent with ureteral bleeding versus just pink tinged urine versus kim clots.  We discussed the presence of urokinase in the urine which causes the clots to dissolve with time.  We discussed the fact that it takes only a very small amount of blood in the urine to make the urine very red appearing and therefore give one the impression that there is much more blood loss that is really present.  He discussed the use of both an upper and lower tract investigation.  I discussed the fact that an upper tract investigation includes a normal renal ultrasound with a significant risks of missing more subtle lesions.  Progressing to a CT scan without contrast and finally the CT scan with contrast being the gold standard to diagnose the small neoplasms.  We discussed the lower tract investigation consisting of a cystoscopy in many of the cases where the upper tract study is negative.  Also discussed the fact that if  there is a contraindication to the use of contrast we would do a noncontrasted study and this also has a chance of missing small lesions.  The specific instance would be cases of diabetes and chronic renal insufficiency.  Discussed the fact that there is about a 96% chance of a negative workup with episodes of microscopic hematuria and with much greater in the face of gross hematuria.  We discussed the fact that this is a non-cumulative test.  In other words if there is hematuria next year I would recommend continuing to work up the condition because of the fact that neoplasms may be small at the first workup and easily are missed.  I discussed the differential diagnosis of hematuria including trauma, neoplasia, infection, etc.  We discussed the fact that if there is any history of chronic kidney disease or risk factors such as diabetes for contrast a noncontrasted study will be utilized.  We will initiate an investigation.  This is in the face of hemodialysis and a minimal urine output she needs a cystoscopy upper tract study was grossly unremarkable but more unable to give contrast material.      Patient reports that she is not currently experiencing any symptoms of urinary incontinence.      Patient's Body mass index is 46.43 kg/m². BMI is above normal parameters. Recommendations include: educational material.              This document has been electronically signed by HAYDER FLETCHER MD December 10, 2019 10:56 AM

## 2019-12-11 PROBLEM — R31.0 GROSS HEMATURIA: Status: ACTIVE | Noted: 2019-12-11

## 2020-07-16 ENCOUNTER — APPOINTMENT (OUTPATIENT)
Dept: CT IMAGING | Facility: HOSPITAL | Age: 50
End: 2020-07-16

## 2020-07-16 ENCOUNTER — HOSPITAL ENCOUNTER (EMERGENCY)
Facility: HOSPITAL | Age: 50
Discharge: HOME | End: 2020-07-16
Attending: EMERGENCY MEDICINE | Admitting: EMERGENCY MEDICINE

## 2020-07-16 VITALS
OXYGEN SATURATION: 99 % | WEIGHT: 260 LBS | RESPIRATION RATE: 20 BRPM | BODY MASS INDEX: 43.32 KG/M2 | TEMPERATURE: 100.9 F | DIASTOLIC BLOOD PRESSURE: 90 MMHG | HEIGHT: 65 IN | HEART RATE: 106 BPM | SYSTOLIC BLOOD PRESSURE: 160 MMHG

## 2020-07-16 DIAGNOSIS — N39.0 ACUTE UTI: Primary | ICD-10-CM

## 2020-07-16 LAB
ALBUMIN SERPL-MCNC: 3.41 G/DL (ref 3.5–5.2)
ALBUMIN/GLOB SERPL: 1.2 G/DL
ALP SERPL-CCNC: 81 U/L (ref 39–117)
ALT SERPL W P-5'-P-CCNC: 16 U/L (ref 1–33)
ANION GAP SERPL CALCULATED.3IONS-SCNC: 12.4 MMOL/L (ref 5–15)
ANISOCYTOSIS BLD QL: NORMAL
APTT PPP: 33 SECONDS (ref 25.6–35.3)
AST SERPL-CCNC: 29 U/L (ref 1–32)
BACTERIA UR QL AUTO: ABNORMAL /HPF
BASOPHILS # BLD AUTO: 0.05 10*3/MM3 (ref 0–0.2)
BASOPHILS NFR BLD AUTO: 0.4 % (ref 0–1.5)
BILIRUB SERPL-MCNC: 0.5 MG/DL (ref 0–1.2)
BILIRUB UR QL STRIP: NEGATIVE
BUN SERPL-MCNC: 26 MG/DL (ref 6–20)
BUN/CREAT SERPL: 8.5 (ref 7–25)
CALCIUM SPEC-SCNC: 8.9 MG/DL (ref 8.6–10.5)
CHLORIDE SERPL-SCNC: 103 MMOL/L (ref 98–107)
CLARITY UR: ABNORMAL
CO2 SERPL-SCNC: 21.6 MMOL/L (ref 22–29)
COLOR UR: YELLOW
CREAT SERPL-MCNC: 3.07 MG/DL (ref 0.57–1)
D-LACTATE SERPL-SCNC: 1.5 MMOL/L (ref 0.5–2)
DEPRECATED RDW RBC AUTO: 60.7 FL (ref 37–54)
EOSINOPHIL # BLD AUTO: 0.17 10*3/MM3 (ref 0–0.4)
EOSINOPHIL NFR BLD AUTO: 1.4 % (ref 0.3–6.2)
ERYTHROCYTE [DISTWIDTH] IN BLOOD BY AUTOMATED COUNT: 14 % (ref 12.3–15.4)
GFR SERPL CREATININE-BSD FRML MDRD: 16 ML/MIN/1.73
GLOBULIN UR ELPH-MCNC: 2.9 GM/DL
GLUCOSE SERPL-MCNC: 120 MG/DL (ref 65–99)
GLUCOSE UR STRIP-MCNC: NEGATIVE MG/DL
HCT VFR BLD AUTO: 34.5 % (ref 34–46.6)
HGB BLD-MCNC: 10 G/DL (ref 12–15.9)
HGB UR QL STRIP.AUTO: ABNORMAL
HYALINE CASTS UR QL AUTO: ABNORMAL /LPF
HYPOCHROMIA BLD QL: NORMAL
IMM GRANULOCYTES # BLD AUTO: 0.09 10*3/MM3 (ref 0–0.05)
IMM GRANULOCYTES NFR BLD AUTO: 0.7 % (ref 0–0.5)
INR PPP: 1.14 (ref 0.9–1.1)
KETONES UR QL STRIP: NEGATIVE
LEUKOCYTE ESTERASE UR QL STRIP.AUTO: ABNORMAL
LYMPHOCYTES # BLD AUTO: 1.11 10*3/MM3 (ref 0.7–3.1)
LYMPHOCYTES NFR BLD AUTO: 8.9 % (ref 19.6–45.3)
MACROCYTES BLD QL SMEAR: NORMAL
MAGNESIUM SERPL-MCNC: 1.9 MG/DL (ref 1.6–2.6)
MCH RBC QN AUTO: 33.6 PG (ref 26.6–33)
MCHC RBC AUTO-ENTMCNC: 29 G/DL (ref 31.5–35.7)
MCV RBC AUTO: 115.8 FL (ref 79–97)
MONOCYTES # BLD AUTO: 0.94 10*3/MM3 (ref 0.1–0.9)
MONOCYTES NFR BLD AUTO: 7.6 % (ref 5–12)
NEUTROPHILS NFR BLD AUTO: 10.05 10*3/MM3 (ref 1.7–7)
NEUTROPHILS NFR BLD AUTO: 81 % (ref 42.7–76)
NITRITE UR QL STRIP: NEGATIVE
NRBC BLD AUTO-RTO: 0 /100 WBC (ref 0–0.2)
PH UR STRIP.AUTO: 7 [PH] (ref 5–8)
PHOSPHATE SERPL-MCNC: 3.1 MG/DL (ref 2.5–4.5)
PLAT MORPH BLD: NORMAL
PLATELET # BLD AUTO: 249 10*3/MM3 (ref 140–450)
PMV BLD AUTO: 9 FL (ref 6–12)
POTASSIUM SERPL-SCNC: 5.1 MMOL/L (ref 3.5–5.2)
PROT SERPL-MCNC: 6.3 G/DL (ref 6–8.5)
PROT UR QL STRIP: ABNORMAL
PROTHROMBIN TIME: 14.5 SECONDS (ref 11.9–14.1)
RBC # BLD AUTO: 2.98 10*6/MM3 (ref 3.77–5.28)
RBC # UR: ABNORMAL /HPF
REF LAB TEST METHOD: ABNORMAL
SODIUM SERPL-SCNC: 137 MMOL/L (ref 136–145)
SP GR UR STRIP: 1.02 (ref 1–1.03)
SQUAMOUS #/AREA URNS HPF: ABNORMAL /HPF
UROBILINOGEN UR QL STRIP: ABNORMAL
WBC # BLD AUTO: 12.41 10*3/MM3 (ref 3.4–10.8)
WBC UR QL AUTO: ABNORMAL /HPF

## 2020-07-16 PROCEDURE — 99284 EMERGENCY DEPT VISIT MOD MDM: CPT

## 2020-07-16 PROCEDURE — 87040 BLOOD CULTURE FOR BACTERIA: CPT | Performed by: NURSE PRACTITIONER

## 2020-07-16 PROCEDURE — 96374 THER/PROPH/DIAG INJ IV PUSH: CPT

## 2020-07-16 PROCEDURE — 25010000002 MORPHINE PER 10 MG: Performed by: NURSE PRACTITIONER

## 2020-07-16 PROCEDURE — 81001 URINALYSIS AUTO W/SCOPE: CPT | Performed by: NURSE PRACTITIONER

## 2020-07-16 PROCEDURE — 74176 CT ABD & PELVIS W/O CONTRAST: CPT | Performed by: RADIOLOGY

## 2020-07-16 PROCEDURE — 85025 COMPLETE CBC W/AUTO DIFF WBC: CPT | Performed by: NURSE PRACTITIONER

## 2020-07-16 PROCEDURE — 85610 PROTHROMBIN TIME: CPT | Performed by: NURSE PRACTITIONER

## 2020-07-16 PROCEDURE — 84100 ASSAY OF PHOSPHORUS: CPT | Performed by: NURSE PRACTITIONER

## 2020-07-16 PROCEDURE — 83605 ASSAY OF LACTIC ACID: CPT | Performed by: NURSE PRACTITIONER

## 2020-07-16 PROCEDURE — 85730 THROMBOPLASTIN TIME PARTIAL: CPT | Performed by: NURSE PRACTITIONER

## 2020-07-16 PROCEDURE — 96375 TX/PRO/DX INJ NEW DRUG ADDON: CPT

## 2020-07-16 PROCEDURE — 83735 ASSAY OF MAGNESIUM: CPT | Performed by: NURSE PRACTITIONER

## 2020-07-16 PROCEDURE — 74176 CT ABD & PELVIS W/O CONTRAST: CPT

## 2020-07-16 PROCEDURE — 85007 BL SMEAR W/DIFF WBC COUNT: CPT | Performed by: NURSE PRACTITIONER

## 2020-07-16 PROCEDURE — 25010000002 ONDANSETRON PER 1 MG: Performed by: NURSE PRACTITIONER

## 2020-07-16 PROCEDURE — 87086 URINE CULTURE/COLONY COUNT: CPT | Performed by: NURSE PRACTITIONER

## 2020-07-16 PROCEDURE — 80053 COMPREHEN METABOLIC PANEL: CPT | Performed by: NURSE PRACTITIONER

## 2020-07-16 RX ORDER — DOXYCYCLINE 100 MG/1
100 CAPSULE ORAL DAILY
Qty: 7 CAPSULE | Refills: 0 | Status: SHIPPED | OUTPATIENT
Start: 2020-07-16 | End: 2021-02-10

## 2020-07-16 RX ORDER — SODIUM CHLORIDE 0.9 % (FLUSH) 0.9 %
10 SYRINGE (ML) INJECTION AS NEEDED
Status: DISCONTINUED | OUTPATIENT
Start: 2020-07-16 | End: 2020-07-16 | Stop reason: HOSPADM

## 2020-07-16 RX ORDER — ACETAMINOPHEN 325 MG/1
975 TABLET ORAL ONCE
Status: COMPLETED | OUTPATIENT
Start: 2020-07-16 | End: 2020-07-16

## 2020-07-16 RX ORDER — CLOPIDOGREL BISULFATE 75 MG/1
75 TABLET ORAL DAILY
COMMUNITY
End: 2021-02-10

## 2020-07-16 RX ORDER — DOXYCYCLINE 100 MG/1
100 CAPSULE ORAL 2 TIMES DAILY
Qty: 14 CAPSULE | Refills: 0 | Status: SHIPPED | OUTPATIENT
Start: 2020-07-16 | End: 2021-02-10

## 2020-07-16 RX ORDER — ONDANSETRON 2 MG/ML
4 INJECTION INTRAMUSCULAR; INTRAVENOUS ONCE
Status: COMPLETED | OUTPATIENT
Start: 2020-07-16 | End: 2020-07-16

## 2020-07-16 RX ADMIN — ONDANSETRON 4 MG: 2 INJECTION INTRAMUSCULAR; INTRAVENOUS at 15:20

## 2020-07-16 RX ADMIN — MORPHINE SULFATE 4 MG: 4 INJECTION, SOLUTION INTRAMUSCULAR; INTRAVENOUS at 15:22

## 2020-07-16 RX ADMIN — ACETAMINOPHEN 975 MG: 325 TABLET ORAL at 18:56

## 2020-07-16 NOTE — ED PROVIDER NOTES
Subjective     Flank Pain   Pain location:  L flank  Pain quality: aching    Pain severity:  Moderate  Onset quality:  Gradual  Duration:  3 days  Timing:  Constant  Progression:  Waxing and waning  Chronicity:  New  Context: not alcohol use, not awakening from sleep, not diet changes, not eating, not medication withdrawal, not previous surgeries, not recent sexual activity, not sick contacts and not suspicious food intake    Relieved by:  Nothing  Worsened by:  Nothing  Ineffective treatments:  None tried  Associated symptoms: fever and nausea    Risk factors comment:  Dialysis patient      Review of Systems   Constitutional: Positive for fever.   HENT: Negative.    Eyes: Negative.    Respiratory: Negative.    Cardiovascular: Negative.    Gastrointestinal: Positive for nausea.   Endocrine: Negative.    Genitourinary: Positive for flank pain.   Skin: Negative.    Allergic/Immunologic: Negative.    Neurological: Negative.    Hematological: Negative.    Psychiatric/Behavioral: Negative.        Past Medical History:   Diagnosis Date   • Dialysis patient (CMS/HCC)    • Disease of thyroid gland    • Histoplasmosis    • Hypertension    • Renal disorder        Allergies   Allergen Reactions   • Latex Hives       Past Surgical History:   Procedure Laterality Date   • ARTERIOVENOUS FISTULA     •  SECTION     • LUNG BIOPSY     • RENAL BIOPSY     • TUBAL ABDOMINAL LIGATION     • VENOUS ACCESS DEVICE (PORT) INSERTION AND REMOVAL Right 2018    Procedure: INSERTION AND REMOVAL OF NEW TUNNELED DIAYLSIS CATHETER;  Surgeon: Jose Alberto Urena MD;  Location: [facility];  Service: General   • VENOUS ACCESS DEVICE (PORT) INSERTION AND REMOVAL N/A 2018    Procedure: REMOVAL AND INSERTION OF TUNNELED DIAYLSIS CATHETER;  Surgeon: Jose Alberto Urena MD;  Location: [facility];  Service: General       Family History   Problem Relation Age of Onset   • No Known Problems Father    • No Known Problems Mother        Social History      Socioeconomic History   • Marital status:      Spouse name: Not on file   • Number of children: Not on file   • Years of education: Not on file   • Highest education level: Not on file   Tobacco Use   • Smoking status: Current Every Day Smoker     Packs/day: 0.50     Types: Cigarettes   • Smokeless tobacco: Never Used   Substance and Sexual Activity   • Alcohol use: No   • Drug use: No   • Sexual activity: Never           Objective   Physical Exam   Constitutional: She is oriented to person, place, and time. She appears well-developed and well-nourished.   HENT:   Head: Normocephalic.   Right Ear: External ear normal.   Left Ear: External ear normal.   Mouth/Throat: Oropharynx is clear and moist.   Eyes: Pupils are equal, round, and reactive to light. Conjunctivae and EOM are normal.   Neck: Normal range of motion. Neck supple.   Cardiovascular: Normal rate, regular rhythm, normal heart sounds and intact distal pulses.   Pulmonary/Chest: Effort normal and breath sounds normal.   Abdominal: Soft. Bowel sounds are normal.   Musculoskeletal: Normal range of motion.   Neurological: She is alert and oriented to person, place, and time.   Skin: Skin is warm and dry. Capillary refill takes less than 2 seconds.   Psychiatric: She has a normal mood and affect. Her behavior is normal. Thought content normal.   Nursing note and vitals reviewed.      Procedures           ED Course  ED Course as of Jul 16 1703   Thu Jul 16, 2020   1510 Report to Dr. Bashir MD who will resume care of the patient.    [PETER]      ED Course User Index  [PETER] [place], APRN                                           [facility]    Final diagnoses:   Acute UTI            Lex Camejo MD  07/16/20 8652

## 2020-07-17 LAB — BACTERIA SPEC AEROBE CULT: NO GROWTH

## 2020-07-21 LAB
BACTERIA SPEC AEROBE CULT: NORMAL
BACTERIA SPEC AEROBE CULT: NORMAL

## 2021-02-10 ENCOUNTER — HOSPITAL ENCOUNTER (INPATIENT)
Facility: HOSPITAL | Age: 51
LOS: 6 days | Discharge: HOME | End: 2021-02-16
Attending: EMERGENCY MEDICINE | Admitting: INTERNAL MEDICINE

## 2021-02-10 ENCOUNTER — APPOINTMENT (OUTPATIENT)
Dept: GENERAL RADIOLOGY | Facility: HOSPITAL | Age: 51
End: 2021-02-10

## 2021-02-10 ENCOUNTER — APPOINTMENT (OUTPATIENT)
Dept: ULTRASOUND IMAGING | Facility: HOSPITAL | Age: 51
End: 2021-02-10

## 2021-02-10 ENCOUNTER — APPOINTMENT (OUTPATIENT)
Dept: CT IMAGING | Facility: HOSPITAL | Age: 51
End: 2021-02-10

## 2021-02-10 DIAGNOSIS — K82.8 DYSFUNCTIONAL GALLBLADDER: ICD-10-CM

## 2021-02-10 DIAGNOSIS — R10.9 INTRACTABLE ABDOMINAL PAIN: Primary | ICD-10-CM

## 2021-02-10 DIAGNOSIS — N18.6 END STAGE RENAL DISEASE (HCC): ICD-10-CM

## 2021-02-10 LAB
ALBUMIN SERPL-MCNC: 3.49 G/DL (ref 3.5–5.2)
ALBUMIN/GLOB SERPL: 1.1 G/DL
ALP SERPL-CCNC: 75 U/L (ref 39–117)
ALT SERPL W P-5'-P-CCNC: 16 U/L (ref 1–33)
AMYLASE SERPL-CCNC: 84 U/L (ref 28–100)
ANION GAP SERPL CALCULATED.3IONS-SCNC: 9.7 MMOL/L (ref 5–15)
AST SERPL-CCNC: 17 U/L (ref 1–32)
BACTERIA UR QL AUTO: ABNORMAL /HPF
BASOPHILS # BLD AUTO: 0.06 10*3/MM3 (ref 0–0.2)
BASOPHILS NFR BLD AUTO: 0.7 % (ref 0–1.5)
BILIRUB SERPL-MCNC: <0.2 MG/DL (ref 0–1.2)
BILIRUB UR QL STRIP: NEGATIVE
BUN SERPL-MCNC: 33 MG/DL (ref 6–20)
BUN/CREAT SERPL: 11.8 (ref 7–25)
CALCIUM SPEC-SCNC: 9.3 MG/DL (ref 8.6–10.5)
CHLORIDE SERPL-SCNC: 107 MMOL/L (ref 98–107)
CLARITY UR: CLEAR
CO2 SERPL-SCNC: 20.3 MMOL/L (ref 22–29)
COLOR UR: YELLOW
CREAT SERPL-MCNC: 2.79 MG/DL (ref 0.57–1)
CRP SERPL-MCNC: 13.77 MG/DL (ref 0–0.5)
D-LACTATE SERPL-SCNC: 0.9 MMOL/L (ref 0.5–2)
DEPRECATED RDW RBC AUTO: 59.3 FL (ref 37–54)
EOSINOPHIL # BLD AUTO: 0.33 10*3/MM3 (ref 0–0.4)
EOSINOPHIL NFR BLD AUTO: 4 % (ref 0.3–6.2)
ERYTHROCYTE [DISTWIDTH] IN BLOOD BY AUTOMATED COUNT: 15.3 % (ref 12.3–15.4)
ERYTHROCYTE [SEDIMENTATION RATE] IN BLOOD: 67 MM/HR (ref 0–30)
FLUAV RNA RESP QL NAA+PROBE: NOT DETECTED
FLUBV RNA RESP QL NAA+PROBE: NOT DETECTED
GFR SERPL CREATININE-BSD FRML MDRD: 18 ML/MIN/1.73
GLOBULIN UR ELPH-MCNC: 3.2 GM/DL
GLUCOSE SERPL-MCNC: 113 MG/DL (ref 65–99)
GLUCOSE UR STRIP-MCNC: NEGATIVE MG/DL
HCT VFR BLD AUTO: 30.5 % (ref 34–46.6)
HGB BLD-MCNC: 9.2 G/DL (ref 12–15.9)
HGB UR QL STRIP.AUTO: NEGATIVE
HOLD SPECIMEN: NORMAL
HOLD SPECIMEN: NORMAL
HYALINE CASTS UR QL AUTO: ABNORMAL /LPF
IMM GRANULOCYTES # BLD AUTO: 0.05 10*3/MM3 (ref 0–0.05)
IMM GRANULOCYTES NFR BLD AUTO: 0.6 % (ref 0–0.5)
KETONES UR QL STRIP: NEGATIVE
LEUKOCYTE ESTERASE UR QL STRIP.AUTO: NEGATIVE
LIPASE SERPL-CCNC: 107 U/L (ref 13–60)
LYMPHOCYTES # BLD AUTO: 2.53 10*3/MM3 (ref 0.7–3.1)
LYMPHOCYTES NFR BLD AUTO: 30.4 % (ref 19.6–45.3)
MAGNESIUM SERPL-MCNC: 2 MG/DL (ref 1.6–2.6)
MCH RBC QN AUTO: 31.6 PG (ref 26.6–33)
MCHC RBC AUTO-ENTMCNC: 30.2 G/DL (ref 31.5–35.7)
MCV RBC AUTO: 104.8 FL (ref 79–97)
MONOCYTES # BLD AUTO: 0.52 10*3/MM3 (ref 0.1–0.9)
MONOCYTES NFR BLD AUTO: 6.2 % (ref 5–12)
NEUTROPHILS NFR BLD AUTO: 4.84 10*3/MM3 (ref 1.7–7)
NEUTROPHILS NFR BLD AUTO: 58.1 % (ref 42.7–76)
NITRITE UR QL STRIP: NEGATIVE
NRBC BLD AUTO-RTO: 0 /100 WBC (ref 0–0.2)
PH UR STRIP.AUTO: <=5 [PH] (ref 5–8)
PLATELET # BLD AUTO: 239 10*3/MM3 (ref 140–450)
PMV BLD AUTO: 9 FL (ref 6–12)
POTASSIUM SERPL-SCNC: 4.3 MMOL/L (ref 3.5–5.2)
PROT SERPL-MCNC: 6.7 G/DL (ref 6–8.5)
PROT UR QL STRIP: ABNORMAL
RBC # BLD AUTO: 2.91 10*6/MM3 (ref 3.77–5.28)
RBC # UR: ABNORMAL /HPF
REF LAB TEST METHOD: ABNORMAL
SARS-COV-2 RNA RESP QL NAA+PROBE: NOT DETECTED
SODIUM SERPL-SCNC: 137 MMOL/L (ref 136–145)
SP GR UR STRIP: 1.02 (ref 1–1.03)
SQUAMOUS #/AREA URNS HPF: ABNORMAL /HPF
TROPONIN T SERPL-MCNC: <0.01 NG/ML (ref 0–0.03)
TROPONIN T SERPL-MCNC: <0.01 NG/ML (ref 0–0.03)
TSH SERPL DL<=0.05 MIU/L-ACNC: 7.77 UIU/ML (ref 0.27–4.2)
UROBILINOGEN UR QL STRIP: ABNORMAL
WBC # BLD AUTO: 8.33 10*3/MM3 (ref 3.4–10.8)
WBC UR QL AUTO: ABNORMAL /HPF
WHOLE BLOOD HOLD SPECIMEN: NORMAL
WHOLE BLOOD HOLD SPECIMEN: NORMAL

## 2021-02-10 PROCEDURE — 76705 ECHO EXAM OF ABDOMEN: CPT | Performed by: RADIOLOGY

## 2021-02-10 PROCEDURE — 99284 EMERGENCY DEPT VISIT MOD MDM: CPT

## 2021-02-10 PROCEDURE — 83036 HEMOGLOBIN GLYCOSYLATED A1C: CPT | Performed by: PHYSICIAN ASSISTANT

## 2021-02-10 PROCEDURE — 71045 X-RAY EXAM CHEST 1 VIEW: CPT

## 2021-02-10 PROCEDURE — 80074 ACUTE HEPATITIS PANEL: CPT | Performed by: INTERNAL MEDICINE

## 2021-02-10 PROCEDURE — 83605 ASSAY OF LACTIC ACID: CPT | Performed by: EMERGENCY MEDICINE

## 2021-02-10 PROCEDURE — 99223 1ST HOSP IP/OBS HIGH 75: CPT | Performed by: PHYSICIAN ASSISTANT

## 2021-02-10 PROCEDURE — 93010 ELECTROCARDIOGRAM REPORT: CPT | Performed by: INTERNAL MEDICINE

## 2021-02-10 PROCEDURE — 25010000002 MORPHINE PER 10 MG: Performed by: EMERGENCY MEDICINE

## 2021-02-10 PROCEDURE — 85652 RBC SED RATE AUTOMATED: CPT | Performed by: EMERGENCY MEDICINE

## 2021-02-10 PROCEDURE — 85025 COMPLETE CBC W/AUTO DIFF WBC: CPT | Performed by: EMERGENCY MEDICINE

## 2021-02-10 PROCEDURE — 82150 ASSAY OF AMYLASE: CPT | Performed by: EMERGENCY MEDICINE

## 2021-02-10 PROCEDURE — 71045 X-RAY EXAM CHEST 1 VIEW: CPT | Performed by: RADIOLOGY

## 2021-02-10 PROCEDURE — 93005 ELECTROCARDIOGRAM TRACING: CPT | Performed by: EMERGENCY MEDICINE

## 2021-02-10 PROCEDURE — 86140 C-REACTIVE PROTEIN: CPT | Performed by: EMERGENCY MEDICINE

## 2021-02-10 PROCEDURE — 87636 SARSCOV2 & INF A&B AMP PRB: CPT | Performed by: EMERGENCY MEDICINE

## 2021-02-10 PROCEDURE — 74176 CT ABD & PELVIS W/O CONTRAST: CPT

## 2021-02-10 PROCEDURE — 84484 ASSAY OF TROPONIN QUANT: CPT | Performed by: EMERGENCY MEDICINE

## 2021-02-10 PROCEDURE — 84484 ASSAY OF TROPONIN QUANT: CPT | Performed by: INTERNAL MEDICINE

## 2021-02-10 PROCEDURE — 84481 FREE ASSAY (FT-3): CPT | Performed by: PHYSICIAN ASSISTANT

## 2021-02-10 PROCEDURE — 74176 CT ABD & PELVIS W/O CONTRAST: CPT | Performed by: RADIOLOGY

## 2021-02-10 PROCEDURE — 84443 ASSAY THYROID STIM HORMONE: CPT | Performed by: EMERGENCY MEDICINE

## 2021-02-10 PROCEDURE — 76705 ECHO EXAM OF ABDOMEN: CPT

## 2021-02-10 PROCEDURE — 25010000002 ONDANSETRON PER 1 MG: Performed by: EMERGENCY MEDICINE

## 2021-02-10 PROCEDURE — 83735 ASSAY OF MAGNESIUM: CPT | Performed by: EMERGENCY MEDICINE

## 2021-02-10 PROCEDURE — 81001 URINALYSIS AUTO W/SCOPE: CPT | Performed by: EMERGENCY MEDICINE

## 2021-02-10 PROCEDURE — 93005 ELECTROCARDIOGRAM TRACING: CPT | Performed by: INTERNAL MEDICINE

## 2021-02-10 PROCEDURE — 87040 BLOOD CULTURE FOR BACTERIA: CPT | Performed by: EMERGENCY MEDICINE

## 2021-02-10 PROCEDURE — 83690 ASSAY OF LIPASE: CPT | Performed by: EMERGENCY MEDICINE

## 2021-02-10 PROCEDURE — 80053 COMPREHEN METABOLIC PANEL: CPT | Performed by: EMERGENCY MEDICINE

## 2021-02-10 PROCEDURE — 84439 ASSAY OF FREE THYROXINE: CPT | Performed by: PHYSICIAN ASSISTANT

## 2021-02-10 RX ORDER — NICOTINE 21 MG/24HR
1 PATCH, TRANSDERMAL 24 HOURS TRANSDERMAL ONCE
Status: COMPLETED | OUTPATIENT
Start: 2021-02-10 | End: 2021-02-11

## 2021-02-10 RX ORDER — FAMOTIDINE 40 MG/1
40 TABLET, FILM COATED ORAL 2 TIMES DAILY
Status: ON HOLD | COMMUNITY
End: 2021-02-16 | Stop reason: SDUPTHER

## 2021-02-10 RX ORDER — FAMOTIDINE 20 MG/1
40 TABLET, FILM COATED ORAL
Status: CANCELLED | OUTPATIENT
Start: 2021-02-10

## 2021-02-10 RX ORDER — SODIUM CHLORIDE 0.9 % (FLUSH) 0.9 %
3 SYRINGE (ML) INJECTION EVERY 12 HOURS SCHEDULED
Status: DISCONTINUED | OUTPATIENT
Start: 2021-02-10 | End: 2021-02-16 | Stop reason: HOSPADM

## 2021-02-10 RX ORDER — HYDROXYZINE HYDROCHLORIDE 25 MG/1
50 TABLET, FILM COATED ORAL 3 TIMES DAILY PRN
Status: CANCELLED | OUTPATIENT
Start: 2021-02-10

## 2021-02-10 RX ORDER — HYDROXYZINE PAMOATE 50 MG/1
50 CAPSULE ORAL 3 TIMES DAILY PRN
COMMUNITY

## 2021-02-10 RX ORDER — METHOCARBAMOL 750 MG/1
750 TABLET, FILM COATED ORAL 3 TIMES DAILY PRN
Status: CANCELLED | OUTPATIENT
Start: 2021-02-10

## 2021-02-10 RX ORDER — ONDANSETRON 2 MG/ML
4 INJECTION INTRAMUSCULAR; INTRAVENOUS ONCE
Status: COMPLETED | OUTPATIENT
Start: 2021-02-10 | End: 2021-02-10

## 2021-02-10 RX ORDER — LEVOTHYROXINE SODIUM 0.05 MG/1
50 TABLET ORAL DAILY
Status: CANCELLED | OUTPATIENT
Start: 2021-02-10

## 2021-02-10 RX ORDER — SODIUM CHLORIDE 0.9 % (FLUSH) 0.9 %
10 SYRINGE (ML) INJECTION AS NEEDED
Status: DISCONTINUED | OUTPATIENT
Start: 2021-02-10 | End: 2021-02-16 | Stop reason: HOSPADM

## 2021-02-10 RX ORDER — ATENOLOL 50 MG/1
25 TABLET ORAL DAILY
Status: CANCELLED | OUTPATIENT
Start: 2021-02-10

## 2021-02-10 RX ORDER — SODIUM CHLORIDE 0.9 % (FLUSH) 0.9 %
3-10 SYRINGE (ML) INJECTION AS NEEDED
Status: DISCONTINUED | OUTPATIENT
Start: 2021-02-10 | End: 2021-02-16 | Stop reason: HOSPADM

## 2021-02-10 RX ORDER — METOPROLOL TARTRATE 5 MG/5ML
5 INJECTION INTRAVENOUS ONCE
Status: COMPLETED | OUTPATIENT
Start: 2021-02-10 | End: 2021-02-10

## 2021-02-10 RX ORDER — GABAPENTIN 400 MG/1
800 CAPSULE ORAL EVERY 8 HOURS SCHEDULED
Status: CANCELLED | OUTPATIENT
Start: 2021-02-10

## 2021-02-10 RX ADMIN — MORPHINE SULFATE 4 MG: 4 INJECTION, SOLUTION INTRAMUSCULAR; INTRAVENOUS at 19:09

## 2021-02-10 RX ADMIN — ONDANSETRON 4 MG: 2 INJECTION INTRAMUSCULAR; INTRAVENOUS at 17:11

## 2021-02-10 RX ADMIN — METOPROLOL TARTRATE 5 MG: 1 INJECTION, SOLUTION INTRAVENOUS at 18:07

## 2021-02-10 RX ADMIN — MORPHINE SULFATE 4 MG: 4 INJECTION, SOLUTION INTRAMUSCULAR; INTRAVENOUS at 17:12

## 2021-02-10 RX ADMIN — SODIUM CHLORIDE 500 ML: 9 INJECTION, SOLUTION INTRAVENOUS at 18:07

## 2021-02-10 RX ADMIN — NICOTINE 1 PATCH: 21 PATCH, EXTENDED RELEASE TRANSDERMAL at 20:35

## 2021-02-10 NOTE — ED PROVIDER NOTES
"Subjective   Patient is a 50-year-old female who presents with a chief complaint of abdominal pain, and states she has not had dialysis since Wednesday of last week.  Her nephrologist Dr. Bedolla called me prior to her arrival, states the patient has been having a lot of problems with severe abdominal pain, her left upper extremity AV fistula is clotted off and nonfunctional, she had a new temporary dialysis catheter placed in [place] yesterday.  He wants her admitted for further work-up and treatment of her abdominal pain, and also dialysis.  Patient reports that she has been having severe right upper quadrant abdominal pain for \"months\", has been steadily getting worse.  She states that she has had x-rays and CAT scans regarding this at [facility], has never been told of a particular diagnosis.  She reports that she had an EGD at [facility] and was only told that she had a \"hernia\".  She has apparently been getting conflicting information from her surgeon at [place] and [place].  Dr. Bedolla wants her admitted here to get all this sorted out.  Patient denies fever, chills, chest pain, shortness of breath, vomiting, dysuria, hematuria, other symptoms or other complaints.  Patient reports that she does home hemodialysis, and that she still makes urine every day.          Review of Systems   Constitutional: Negative for chills, diaphoresis and fever.   HENT: Negative for ear pain, sore throat and trouble swallowing.    Eyes: Negative for photophobia and pain.   Respiratory: Negative for shortness of breath, wheezing and stridor.    Cardiovascular: Negative for chest pain and palpitations.   Gastrointestinal: Positive for abdominal pain. Negative for abdominal distention, blood in stool, diarrhea and vomiting.   Endocrine: Negative for polydipsia and polyphagia.   Genitourinary: Negative for difficulty urinating and flank pain.   Musculoskeletal: Negative for neck pain and neck stiffness. "   Skin: Negative for color change and pallor.   Neurological: Negative for seizures, syncope and speech difficulty.   Psychiatric/Behavioral: Negative for confusion.   All other systems reviewed and are negative.      Past Medical History:   Diagnosis Date   • Dialysis patient (CMS/HCC)    • Disease of thyroid gland    • Goodpasture's disease (CMS/HCC)    • Histoplasmosis    • Hypertension    • Renal disorder        Allergies   Allergen Reactions   • Latex Hives       Past Surgical History:   Procedure Laterality Date   • ARTERIOVENOUS FISTULA     •  SECTION     • LUNG BIOPSY     • RENAL BIOPSY     • TUBAL ABDOMINAL LIGATION     • VENOUS ACCESS DEVICE (PORT) INSERTION AND REMOVAL Right 2018    Procedure: INSERTION AND REMOVAL OF NEW TUNNELED DIAYLSIS CATHETER;  Surgeon: Jose Alberto Urena MD;  Location: [facility];  Service: General   • VENOUS ACCESS DEVICE (PORT) INSERTION AND REMOVAL N/A 2018    Procedure: REMOVAL AND INSERTION OF TUNNELED DIAYLSIS CATHETER;  Surgeon: Jose Alberto Urena MD;  Location: [facility];  Service: General       Family History   Problem Relation Age of Onset   • No Known Problems Father    • No Known Problems Mother        Social History     Socioeconomic History   • Marital status:      Spouse name: Not on file   • Number of children: Not on file   • Years of education: Not on file   • Highest education level: Not on file   Tobacco Use   • Smoking status: Current Every Day Smoker     Packs/day: 0.50     Types: Cigarettes   • Smokeless tobacco: Never Used   Substance and Sexual Activity   • Alcohol use: No   • Drug use: No   • Sexual activity: Never           Objective   Physical Exam  Vitals signs and nursing note reviewed.   Constitutional:       Appearance: She is well-developed. She is not toxic-appearing or diaphoretic.      Comments: Pleasant female, appears uncomfortable   HENT:      Head: Normocephalic and atraumatic.   Eyes:      General: No scleral icterus.      Pupils: Pupils are equal, round, and reactive to light.   Neck:      Musculoskeletal: Normal range of motion and neck supple. No neck rigidity.      Trachea: No tracheal deviation.   Cardiovascular:      Rate and Rhythm: Normal rate and regular rhythm.   Pulmonary:      Effort: Pulmonary effort is normal. No respiratory distress.      Breath sounds: Normal breath sounds.   Chest:      Chest wall: No tenderness.   Abdominal:      General: Bowel sounds are normal.      Palpations: Abdomen is soft.      Tenderness: There is abdominal tenderness. There is no guarding or rebound.      Comments: Much tenderness in the right upper quadrant, Medina sign is equivocal.  Otherwise mild generalized tenderness.  No acute peritoneal signs.   Musculoskeletal: Normal range of motion.         General: No tenderness.   Skin:     General: Skin is warm and dry.      Capillary Refill: Capillary refill takes less than 2 seconds.      Coloration: Skin is not pale.   Neurological:      General: No focal deficit present.      Mental Status: She is alert and oriented to person, place, and time.      GCS: GCS eye subscore is 4. GCS verbal subscore is 5. GCS motor subscore is 6.      Sensory: No sensory deficit.      Motor: No abnormal muscle tone.      Coordination: Coordination normal.   Psychiatric:         Mood and Affect: Mood normal.         Behavior: Behavior normal.         Procedures  CT Abdomen Pelvis Without Contrast   Final Result       1. No definitive source for the patient's symptoms identified on today's   exam.           2. Other incidental findings as discussed above.           3. moderate to large volume stool                   This report was finalized on 2/10/2021 4:30 PM by Dr. Caesar Ponce MD.          XR Chest 1 View   Final Result   No evidence of active or acute cardiopulmonary disease on today's chest   radiograph.       This report was finalized on 2/10/2021 4:41 PM by Dr. Caesar Ponce MD.          US Gallbladder    Final Result   Impression:    1. Patient was not n.p.o., therefore, the gallbladder is contracted. No   convincing evidence of cholelithiasis. If suspicion is high, then   follow-up is suggested.       This report was finalized on 2/10/2021 4:31 PM by Dr. Caesar Ponce MD.            Results for orders placed or performed during the hospital encounter of 02/10/21   COVID-19 and FLU A/B PCR - Swab, Nasopharynx    Specimen: Nasopharynx; Swab   Result Value Ref Range    COVID19 Not Detected Not Detected - Ref. Range    Influenza A PCR Not Detected Not Detected    Influenza B PCR Not Detected Not Detected   Comprehensive Metabolic Panel    Specimen: Arm, Right; Blood   Result Value Ref Range    Glucose 113 (H) 65 - 99 mg/dL    BUN 33 (H) 6 - 20 mg/dL    Creatinine 2.79 (H) 0.57 - 1.00 mg/dL    Sodium 137 136 - 145 mmol/L    Potassium 4.3 3.5 - 5.2 mmol/L    Chloride 107 98 - 107 mmol/L    CO2 20.3 (L) 22.0 - 29.0 mmol/L    Calcium 9.3 8.6 - 10.5 mg/dL    Total Protein 6.7 6.0 - 8.5 g/dL    Albumin 3.49 (L) 3.50 - 5.20 g/dL    ALT (SGPT) 16 1 - 33 U/L    AST (SGOT) 17 1 - 32 U/L    Alkaline Phosphatase 75 39 - 117 U/L    Total Bilirubin <0.2 0.0 - 1.2 mg/dL    eGFR Non African Amer 18 (L) >60 mL/min/1.73    Globulin 3.2 gm/dL    A/G Ratio 1.1 g/dL    BUN/Creatinine Ratio 11.8 7.0 - 25.0    Anion Gap 9.7 5.0 - 15.0 mmol/L   Lipase    Specimen: Arm, Right; Blood   Result Value Ref Range    Lipase 107 (H) 13 - 60 U/L   Amylase    Specimen: Arm, Right; Blood   Result Value Ref Range    Amylase 84 28 - 100 U/L   Urinalysis With Culture If Indicated - Urine, Clean Catch    Specimen: Urine, Clean Catch   Result Value Ref Range    Color, UA Yellow Yellow, Straw    Appearance, UA Clear Clear    pH, UA <=5.0 5.0 - 8.0    Specific Gravity, UA 1.019 1.005 - 1.030    Glucose, UA Negative Negative    Ketones, UA Negative Negative    Bilirubin, UA Negative Negative    Blood, UA Negative Negative    Protein,  mg/dL (2+) (A)  Negative    Leuk Esterase, UA Negative Negative    Nitrite, UA Negative Negative    Urobilinogen, UA 0.2 E.U./dL 0.2 - 1.0 E.U./dL   Troponin    Specimen: Arm, Right; Blood   Result Value Ref Range    Troponin T <0.010 0.000 - 0.030 ng/mL   C-reactive Protein    Specimen: Arm, Right; Blood   Result Value Ref Range    C-Reactive Protein 13.77 (H) 0.00 - 0.50 mg/dL   Sedimentation Rate    Specimen: Arm, Right; Blood   Result Value Ref Range    Sed Rate 67 (H) 0 - 30 mm/hr   Lactic Acid, Plasma    Specimen: Arm, Right; Blood   Result Value Ref Range    Lactate 0.9 0.5 - 2.0 mmol/L   TSH    Specimen: Arm, Right; Blood   Result Value Ref Range    TSH 7.770 (H) 0.270 - 4.200 uIU/mL   Magnesium    Specimen: Arm, Right; Blood   Result Value Ref Range    Magnesium 2.0 1.6 - 2.6 mg/dL   CBC Auto Differential    Specimen: Arm, Right; Blood   Result Value Ref Range    WBC 8.33 3.40 - 10.80 10*3/mm3    RBC 2.91 (L) 3.77 - 5.28 10*6/mm3    Hemoglobin 9.2 (L) 12.0 - 15.9 g/dL    Hematocrit 30.5 (L) 34.0 - 46.6 %    .8 (H) 79.0 - 97.0 fL    MCH 31.6 26.6 - 33.0 pg    MCHC 30.2 (L) 31.5 - 35.7 g/dL    RDW 15.3 12.3 - 15.4 %    RDW-SD 59.3 (H) 37.0 - 54.0 fl    MPV 9.0 6.0 - 12.0 fL    Platelets 239 140 - 450 10*3/mm3    Neutrophil % 58.1 42.7 - 76.0 %    Lymphocyte % 30.4 19.6 - 45.3 %    Monocyte % 6.2 5.0 - 12.0 %    Eosinophil % 4.0 0.3 - 6.2 %    Basophil % 0.7 0.0 - 1.5 %    Immature Grans % 0.6 (H) 0.0 - 0.5 %    Neutrophils, Absolute 4.84 1.70 - 7.00 10*3/mm3    Lymphocytes, Absolute 2.53 0.70 - 3.10 10*3/mm3    Monocytes, Absolute 0.52 0.10 - 0.90 10*3/mm3    Eosinophils, Absolute 0.33 0.00 - 0.40 10*3/mm3    Basophils, Absolute 0.06 0.00 - 0.20 10*3/mm3    Immature Grans, Absolute 0.05 0.00 - 0.05 10*3/mm3    nRBC 0.0 0.0 - 0.2 /100 WBC   Urinalysis, Microscopic Only - Urine, Clean Catch    Specimen: Urine, Clean Catch   Result Value Ref Range    RBC, UA 0-2 None Seen, 0-2 /HPF    WBC, UA 0-2 None Seen, 0-2 /HPF     Bacteria, UA None Seen None Seen /HPF    Squamous Epithelial Cells, UA 3-6 (A) None Seen, 0-2 /HPF    Hyaline Casts, UA 3-6 None Seen /LPF    Methodology Automated Microscopy    Light Blue Top   Result Value Ref Range    Extra Tube hold for add-on    Green Top (Gel)   Result Value Ref Range    Extra Tube Hold for add-ons.    Lavender Top   Result Value Ref Range    Extra Tube hold for add-on    Gold Top - SST   Result Value Ref Range    Extra Tube Hold for add-ons.                   ED Course  ED Course as of Feb 10 2046   Wed Feb 10, 2021   1549 EKG shows sinus tachycardia with a rate of 120.  NE interval 156 QRS duration 72, QTc 424 ms.  Baseline artifact is present.  No apparent acute ischemia.  No evidence for STEMI.    [CM]   1550 Case discussed with Dr. Goldberg.  She is admitting patient to the hospitalist service.    [CM]      ED Course User Index  [CM] Isiah Flower MD                                           [facility]    Final diagnoses:   Intractable abdominal pain   End stage renal disease (CMS/HCC)             Please note that portions of this note were completed with a voice recognition program.        Isiah Flower MD  02/10/21 2047

## 2021-02-11 ENCOUNTER — APPOINTMENT (OUTPATIENT)
Dept: ULTRASOUND IMAGING | Facility: HOSPITAL | Age: 51
End: 2021-02-11

## 2021-02-11 LAB
25(OH)D3 SERPL-MCNC: 26.5 NG/ML (ref 30–100)
6-ACETYL MORPHINE: NEGATIVE
AMPHET+METHAMPHET UR QL: NEGATIVE
ANION GAP SERPL CALCULATED.3IONS-SCNC: 10 MMOL/L (ref 5–15)
BARBITURATES UR QL SCN: POSITIVE
BASOPHILS # BLD AUTO: 0.06 10*3/MM3 (ref 0–0.2)
BASOPHILS NFR BLD AUTO: 0.6 % (ref 0–1.5)
BENZODIAZ UR QL SCN: POSITIVE
BUN SERPL-MCNC: 30 MG/DL (ref 6–20)
BUN/CREAT SERPL: 11.6 (ref 7–25)
BUPRENORPHINE SERPL-MCNC: NEGATIVE NG/ML
CALCIUM SPEC-SCNC: 9.1 MG/DL (ref 8.6–10.5)
CANNABINOIDS SERPL QL: POSITIVE
CHLORIDE SERPL-SCNC: 107 MMOL/L (ref 98–107)
CO2 SERPL-SCNC: 21 MMOL/L (ref 22–29)
COCAINE UR QL: NEGATIVE
CREAT SERPL-MCNC: 2.59 MG/DL (ref 0.57–1)
CRP SERPL-MCNC: 13.06 MG/DL (ref 0–0.5)
DEPRECATED RDW RBC AUTO: 61.6 FL (ref 37–54)
EOSINOPHIL # BLD AUTO: 0.43 10*3/MM3 (ref 0–0.4)
EOSINOPHIL NFR BLD AUTO: 4.5 % (ref 0.3–6.2)
ERYTHROCYTE [DISTWIDTH] IN BLOOD BY AUTOMATED COUNT: 15.4 % (ref 12.3–15.4)
FOLATE SERPL-MCNC: 3.17 NG/ML (ref 4.78–24.2)
GFR SERPL CREATININE-BSD FRML MDRD: 19 ML/MIN/1.73
GLUCOSE SERPL-MCNC: 92 MG/DL (ref 65–99)
HAV IGM SERPL QL IA: NORMAL
HBA1C MFR BLD: 4.9 % (ref 4.8–5.6)
HBV CORE IGM SERPL QL IA: NORMAL
HBV SURFACE AG SERPL QL IA: NORMAL
HCT VFR BLD AUTO: 31.3 % (ref 34–46.6)
HCV AB SER DONR QL: NORMAL
HGB BLD-MCNC: 9.3 G/DL (ref 12–15.9)
IMM GRANULOCYTES # BLD AUTO: 0.06 10*3/MM3 (ref 0–0.05)
IMM GRANULOCYTES NFR BLD AUTO: 0.6 % (ref 0–0.5)
LYMPHOCYTES # BLD AUTO: 3.32 10*3/MM3 (ref 0.7–3.1)
LYMPHOCYTES NFR BLD AUTO: 34.5 % (ref 19.6–45.3)
MAGNESIUM SERPL-MCNC: 1.9 MG/DL (ref 1.6–2.6)
MCH RBC QN AUTO: 32 PG (ref 26.6–33)
MCHC RBC AUTO-ENTMCNC: 29.7 G/DL (ref 31.5–35.7)
MCV RBC AUTO: 107.6 FL (ref 79–97)
METHADONE UR QL SCN: NEGATIVE
MONOCYTES # BLD AUTO: 0.66 10*3/MM3 (ref 0.1–0.9)
MONOCYTES NFR BLD AUTO: 6.9 % (ref 5–12)
NEUTROPHILS NFR BLD AUTO: 5.09 10*3/MM3 (ref 1.7–7)
NEUTROPHILS NFR BLD AUTO: 52.9 % (ref 42.7–76)
NRBC BLD AUTO-RTO: 0 /100 WBC (ref 0–0.2)
OPIATES UR QL: POSITIVE
OXYCODONE UR QL SCN: NEGATIVE
PCP UR QL SCN: NEGATIVE
PLATELET # BLD AUTO: 293 10*3/MM3 (ref 140–450)
PMV BLD AUTO: 9.2 FL (ref 6–12)
POTASSIUM SERPL-SCNC: 5 MMOL/L (ref 3.5–5.2)
PROCALCITONIN SERPL-MCNC: 0.21 NG/ML (ref 0–0.25)
QT INTERVAL: 294 MS
QT INTERVAL: 300 MS
QTC INTERVAL: 413 MS
QTC INTERVAL: 424 MS
RBC # BLD AUTO: 2.91 10*6/MM3 (ref 3.77–5.28)
RBC MORPH BLD: NORMAL
SMALL PLATELETS BLD QL SMEAR: ADEQUATE
SODIUM SERPL-SCNC: 138 MMOL/L (ref 136–145)
T3FREE SERPL-MCNC: 2.72 PG/ML (ref 2–4.4)
T4 FREE SERPL-MCNC: 1.06 NG/DL (ref 0.93–1.7)
VIT B12 BLD-MCNC: 859 PG/ML (ref 211–946)
WBC # BLD AUTO: 9.62 10*3/MM3 (ref 3.4–10.8)

## 2021-02-11 PROCEDURE — 25010000002 MORPHINE PER 10 MG: Performed by: PHYSICIAN ASSISTANT

## 2021-02-11 PROCEDURE — 80307 DRUG TEST PRSMV CHEM ANLYZR: CPT | Performed by: PHYSICIAN ASSISTANT

## 2021-02-11 PROCEDURE — 83735 ASSAY OF MAGNESIUM: CPT | Performed by: PHYSICIAN ASSISTANT

## 2021-02-11 PROCEDURE — 82306 VITAMIN D 25 HYDROXY: CPT | Performed by: PHYSICIAN ASSISTANT

## 2021-02-11 PROCEDURE — 86140 C-REACTIVE PROTEIN: CPT | Performed by: PHYSICIAN ASSISTANT

## 2021-02-11 PROCEDURE — 80048 BASIC METABOLIC PNL TOTAL CA: CPT | Performed by: INTERNAL MEDICINE

## 2021-02-11 PROCEDURE — 76705 ECHO EXAM OF ABDOMEN: CPT | Performed by: RADIOLOGY

## 2021-02-11 PROCEDURE — 25010000002 ONDANSETRON PER 1 MG: Performed by: PHYSICIAN ASSISTANT

## 2021-02-11 PROCEDURE — 5A1D70Z PERFORMANCE OF URINARY FILTRATION, INTERMITTENT, LESS THAN 6 HOURS PER DAY: ICD-10-PCS | Performed by: SURGERY

## 2021-02-11 PROCEDURE — 82746 ASSAY OF FOLIC ACID SERUM: CPT | Performed by: PHYSICIAN ASSISTANT

## 2021-02-11 PROCEDURE — 84145 PROCALCITONIN (PCT): CPT | Performed by: PHYSICIAN ASSISTANT

## 2021-02-11 PROCEDURE — 85025 COMPLETE CBC W/AUTO DIFF WBC: CPT | Performed by: PHYSICIAN ASSISTANT

## 2021-02-11 PROCEDURE — 25010000002 ENOXAPARIN PER 10 MG: Performed by: INTERNAL MEDICINE

## 2021-02-11 PROCEDURE — 76705 ECHO EXAM OF ABDOMEN: CPT

## 2021-02-11 PROCEDURE — 85007 BL SMEAR W/DIFF WBC COUNT: CPT | Performed by: PHYSICIAN ASSISTANT

## 2021-02-11 PROCEDURE — 82607 VITAMIN B-12: CPT | Performed by: PHYSICIAN ASSISTANT

## 2021-02-11 RX ORDER — GABAPENTIN 400 MG/1
400 CAPSULE ORAL 3 TIMES DAILY
Status: DISCONTINUED | OUTPATIENT
Start: 2021-02-11 | End: 2021-02-16 | Stop reason: HOSPADM

## 2021-02-11 RX ORDER — MAGNESIUM CARB/ALUMINUM HYDROX 105-160MG
296 TABLET,CHEWABLE ORAL ONCE
Status: COMPLETED | OUTPATIENT
Start: 2021-02-11 | End: 2021-02-11

## 2021-02-11 RX ORDER — HYDROXYZINE HYDROCHLORIDE 25 MG/1
50 TABLET, FILM COATED ORAL 3 TIMES DAILY PRN
Status: DISCONTINUED | OUTPATIENT
Start: 2021-02-11 | End: 2021-02-16 | Stop reason: HOSPADM

## 2021-02-11 RX ORDER — MORPHINE SULFATE 2 MG/ML
1 INJECTION, SOLUTION INTRAMUSCULAR; INTRAVENOUS EVERY 4 HOURS PRN
Status: DISCONTINUED | OUTPATIENT
Start: 2021-02-11 | End: 2021-02-13

## 2021-02-11 RX ORDER — BISACODYL 5 MG/1
10 TABLET, DELAYED RELEASE ORAL DAILY PRN
Status: DISCONTINUED | OUTPATIENT
Start: 2021-02-11 | End: 2021-02-16 | Stop reason: HOSPADM

## 2021-02-11 RX ORDER — ATENOLOL 25 MG/1
25 TABLET ORAL DAILY
Status: DISCONTINUED | OUTPATIENT
Start: 2021-02-11 | End: 2021-02-16 | Stop reason: HOSPADM

## 2021-02-11 RX ORDER — ONDANSETRON 2 MG/ML
4 INJECTION INTRAMUSCULAR; INTRAVENOUS EVERY 6 HOURS PRN
Status: DISCONTINUED | OUTPATIENT
Start: 2021-02-11 | End: 2021-02-16 | Stop reason: HOSPADM

## 2021-02-11 RX ORDER — ACETAMINOPHEN 325 MG/1
650 TABLET ORAL EVERY 6 HOURS PRN
Status: DISCONTINUED | OUTPATIENT
Start: 2021-02-11 | End: 2021-02-16 | Stop reason: HOSPADM

## 2021-02-11 RX ORDER — POLYETHYLENE GLYCOL 3350 17 G/17G
17 POWDER, FOR SOLUTION ORAL DAILY
Status: DISCONTINUED | OUTPATIENT
Start: 2021-02-11 | End: 2021-02-16 | Stop reason: HOSPADM

## 2021-02-11 RX ORDER — POLYETHYLENE GLYCOL 3350 17 G/17G
17 POWDER, FOR SOLUTION ORAL ONCE
Status: COMPLETED | OUTPATIENT
Start: 2021-02-11 | End: 2021-02-11

## 2021-02-11 RX ORDER — PANTOPRAZOLE SODIUM 40 MG/10ML
40 INJECTION, POWDER, LYOPHILIZED, FOR SOLUTION INTRAVENOUS
Status: DISCONTINUED | OUTPATIENT
Start: 2021-02-11 | End: 2021-02-13

## 2021-02-11 RX ORDER — DEXTROSE MONOHYDRATE 25 G/50ML
25 INJECTION, SOLUTION INTRAVENOUS
Status: DISCONTINUED | OUTPATIENT
Start: 2021-02-11 | End: 2021-02-16 | Stop reason: HOSPADM

## 2021-02-11 RX ORDER — METHOCARBAMOL 750 MG/1
750 TABLET, FILM COATED ORAL 3 TIMES DAILY PRN
Status: DISCONTINUED | OUTPATIENT
Start: 2021-02-11 | End: 2021-02-16 | Stop reason: HOSPADM

## 2021-02-11 RX ORDER — NICOTINE POLACRILEX 4 MG
15 LOZENGE BUCCAL
Status: DISCONTINUED | OUTPATIENT
Start: 2021-02-11 | End: 2021-02-16 | Stop reason: HOSPADM

## 2021-02-11 RX ORDER — BISACODYL 5 MG/1
10 TABLET, DELAYED RELEASE ORAL DAILY
Status: DISCONTINUED | OUTPATIENT
Start: 2021-02-11 | End: 2021-02-16 | Stop reason: HOSPADM

## 2021-02-11 RX ORDER — FAMOTIDINE 20 MG/1
40 TABLET, FILM COATED ORAL DAILY
Status: DISCONTINUED | OUTPATIENT
Start: 2021-02-11 | End: 2021-02-11

## 2021-02-11 RX ORDER — DOCUSATE SODIUM 100 MG/1
100 CAPSULE, LIQUID FILLED ORAL 2 TIMES DAILY
Status: DISCONTINUED | OUTPATIENT
Start: 2021-02-11 | End: 2021-02-16 | Stop reason: HOSPADM

## 2021-02-11 RX ORDER — NITROGLYCERIN 0.4 MG/1
0.4 TABLET SUBLINGUAL
Status: DISCONTINUED | OUTPATIENT
Start: 2021-02-11 | End: 2021-02-16 | Stop reason: HOSPADM

## 2021-02-11 RX ORDER — LEVOTHYROXINE SODIUM 0.05 MG/1
50 TABLET ORAL DAILY
Status: DISCONTINUED | OUTPATIENT
Start: 2021-02-11 | End: 2021-02-16 | Stop reason: HOSPADM

## 2021-02-11 RX ADMIN — ENOXAPARIN SODIUM 30 MG: 30 INJECTION SUBCUTANEOUS at 08:27

## 2021-02-11 RX ADMIN — ACETAMINOPHEN 650 MG: 325 TABLET ORAL at 05:09

## 2021-02-11 RX ADMIN — SODIUM CHLORIDE, PRESERVATIVE FREE 3 ML: 5 INJECTION INTRAVENOUS at 08:34

## 2021-02-11 RX ADMIN — DOCUSATE SODIUM 100 MG: 100 CAPSULE ORAL at 20:44

## 2021-02-11 RX ADMIN — GABAPENTIN 400 MG: 400 CAPSULE ORAL at 09:23

## 2021-02-11 RX ADMIN — LEVOTHYROXINE SODIUM 50 MCG: 50 TABLET ORAL at 08:27

## 2021-02-11 RX ADMIN — SODIUM CHLORIDE, PRESERVATIVE FREE 10 ML: 5 INJECTION INTRAVENOUS at 17:30

## 2021-02-11 RX ADMIN — MORPHINE SULFATE 1 MG: 2 INJECTION, SOLUTION INTRAMUSCULAR; INTRAVENOUS at 22:03

## 2021-02-11 RX ADMIN — MORPHINE SULFATE 1 MG: 2 INJECTION, SOLUTION INTRAMUSCULAR; INTRAVENOUS at 17:29

## 2021-02-11 RX ADMIN — CITROMA MAGNESIUM CITRATE 296 ML: 1.75 LIQUID ORAL at 20:45

## 2021-02-11 RX ADMIN — BISACODYL 10 MG: 5 TABLET, COATED ORAL at 17:26

## 2021-02-11 RX ADMIN — SODIUM CHLORIDE, PRESERVATIVE FREE 3 ML: 5 INJECTION INTRAVENOUS at 20:45

## 2021-02-11 RX ADMIN — MORPHINE SULFATE 1 MG: 2 INJECTION, SOLUTION INTRAMUSCULAR; INTRAVENOUS at 11:35

## 2021-02-11 RX ADMIN — GABAPENTIN 400 MG: 400 CAPSULE ORAL at 17:26

## 2021-02-11 RX ADMIN — HYDROXYZINE HYDROCHLORIDE 50 MG: 25 TABLET, FILM COATED ORAL at 11:52

## 2021-02-11 RX ADMIN — SODIUM CHLORIDE 1000 ML: 9 INJECTION, SOLUTION INTRAVENOUS at 12:54

## 2021-02-11 RX ADMIN — POLYETHYLENE GLYCOL (3350) 17 G: 17 POWDER, FOR SOLUTION ORAL at 20:45

## 2021-02-11 RX ADMIN — DOCUSATE SODIUM 100 MG: 100 CAPSULE ORAL at 08:27

## 2021-02-11 RX ADMIN — POLYETHYLENE GLYCOL (3350) 17 G: 17 POWDER, FOR SOLUTION ORAL at 08:27

## 2021-02-11 RX ADMIN — SODIUM CHLORIDE, PRESERVATIVE FREE 10 ML: 5 INJECTION INTRAVENOUS at 11:37

## 2021-02-11 RX ADMIN — MORPHINE SULFATE 1 MG: 2 INJECTION, SOLUTION INTRAMUSCULAR; INTRAVENOUS at 03:23

## 2021-02-11 RX ADMIN — PANTOPRAZOLE SODIUM 40 MG: 40 INJECTION, POWDER, FOR SOLUTION INTRAVENOUS at 05:00

## 2021-02-11 RX ADMIN — ATENOLOL 25 MG: 25 TABLET ORAL at 08:34

## 2021-02-11 RX ADMIN — ONDANSETRON 4 MG: 2 INJECTION INTRAMUSCULAR; INTRAVENOUS at 22:06

## 2021-02-11 RX ADMIN — GABAPENTIN 400 MG: 400 CAPSULE ORAL at 20:44

## 2021-02-11 NOTE — H&P
[facility] Medicine Services  HISTORY & PHYSICAL    Patient Identification:  Name:  Mercedes Che  Age:  50 y.o.  Sex:  female  :  1970  MRN:  [id number]   Visit Number:  [id number]  Admit Date: 2/10/2021   Primary Care Physician:  Whitney Angel APRN     Subjective     Chief complaint:   Chief Complaint   Patient presents with   • Abdominal pain    • Vascular Access Problem     History of presenting illness:   Patient is a 50 y.o. female with past medical history significant for ESRD on HD, history of Goodpasture's disease, essential hypertension, anemia of chronic disease, hypothyroidism, GERD, anxiety, and morbid obesity that presented to the [facility] emergency department for evaluation of abdominal pain.  Patient states that since 2020 she has had intermittent right upper quadrant pain.  Patient states that since then, her pain has progressively worsened.  She reports radiation of the pain into her back.  She reports abdominal distention and swelling.  She reports her pain is worse positionally, worse with lying down flat or standing.  Patient states at times her pain will be so severe that it takes her breath away.  Patient states she has to sleep sitting completely up to prevent worsening pain.  She denies any worsening pain with eating, in fact she ate chicken nuggets prior to come to the emergency department on this admission.  Patient states that she has been seen outpatient as well is the [facility] ER where she is underwent several ultrasounds and CT scans.  She states she is unsure what the scans revealed.  Patient states that she has also recently underwent EGD last week at [facility].  Patient states she was initially told she had a hernia and then was told she had something wrong with her gallbladder.  She reports she has been getting conflicting information from all of her providers at [facility] in [place] that a  definitive diagnosis.  Patient also states that last week her left upper extremity AV fistula was malfunctioning, she states that her doctor could have gotten her in over the weekend to fix it without anesthesia but she refused and his next availability was not until Tuesday.  By then, she states her AV fistula clotted off.  Patient states that on Tuesday in the emergency department, she had right subclavian dialysis catheter placed.  However, she states she has not had dialysis in a week.  Patient does go on to report constipation over the last week, states she was given lactulose in the emergency department over the weekend that did not give her much relief.  Patient states she has previously not had any bowel issues and was pretty regular.  Denies any vomiting.  She denies any fevers or chills.  Denies any chest pain or dyspnea.  She denies any urinary symptoms, she states she does still make urine.  She denies any lower extremity edema.    Upon arrival to the ED, vitals were temperature 98.7, heart rate 1 20-1 30, respiratory rate 16, blood pressure 135/85, and oxygen saturation 97% on room air.  Troponin T negative x2.  CMP with glucose 113, CO2 20.3, creatinine 2.79, BUN 33, EGFR 18, and albumin 3.49.  TSH 7.770.  Amylase 84 with lipase 107.  CRP 13.77 with lactic acid of 0.9.  Sed rate 67.  Magnesium 2.0.  CBC with hemoglobin 9.2, hematocrit 30.5, and .8, otherwise unremarkable.  Urinalysis with 2+ protein, otherwise unremarkable.  COVID-19 and influenza screening negative.  Gallbladder ultrasound with patient not n.p.o., therefore the gallbladder was contracted with no convincing evidence of cholelithiasis, radiology recommending if suspicion is high then follow-up with repeat imaging.  CT abdomen pelvis with no definitive source for the patient's symptoms identified on today's exam, there is also moderate to large volume stool.  Chest x-ray was with no evidence of acute cardiopulmonary disease.   While in the emergency department, patient was administered 5 mg IV Lopressor, 2 doses of 4 mg IV morphine, 4 mg IV Zofran, and a 500 mL bolus normal saline.    Patient has been admitted to the telemetry floor for further evaluation and management.     Present during exam: N/A   ---------------------------------------------------------------------------------------------------------------------   Review of Systems   Constitutional: Positive for appetite change and fatigue. Negative for activity change, chills, diaphoresis and fever.   HENT: Negative for congestion, postnasal drip, rhinorrhea, sinus pain, sore throat and trouble swallowing.    Eyes: Negative for discharge and visual disturbance.   Respiratory: Negative for cough, chest tightness, shortness of breath and wheezing.    Cardiovascular: Negative for chest pain, palpitations and leg swelling.   Gastrointestinal: Positive for abdominal pain and constipation. Negative for diarrhea, nausea and vomiting.   Endocrine: Negative for cold intolerance and heat intolerance.   Genitourinary: Negative for decreased urine volume, dysuria, frequency and urgency.   Musculoskeletal: Negative for arthralgias, gait problem and myalgias.   Skin: Negative for color change, rash and wound.   Allergic/Immunologic: Negative for environmental allergies and immunocompromised state.   Neurological: Positive for headaches. Negative for dizziness, syncope, weakness and light-headedness.   Hematological: Negative for adenopathy. Does not bruise/bleed easily.   Psychiatric/Behavioral: Negative for confusion and decreased concentration. The patient is not nervous/anxious.       ---------------------------------------------------------------------------------------------------------------------   Past Medical History:   Diagnosis Date   • Dialysis patient (CMS/HCC)    • Disease of thyroid gland    • Goodpasture's disease (CMS/HCC)    • Histoplasmosis    • Hypertension    • Renal disorder       Past Surgical History:   Procedure Laterality Date   • ARTERIOVENOUS FISTULA     •  SECTION     • LUNG BIOPSY     • RENAL BIOPSY     • TUBAL ABDOMINAL LIGATION     • VENOUS ACCESS DEVICE (PORT) INSERTION AND REMOVAL Right 2018    Procedure: INSERTION AND REMOVAL OF NEW TUNNELED DIAYLSIS CATHETER;  Surgeon: Jose Alberto Urena MD;  Location: [facility];  Service: General   • VENOUS ACCESS DEVICE (PORT) INSERTION AND REMOVAL N/A 2018    Procedure: REMOVAL AND INSERTION OF TUNNELED DIAYLSIS CATHETER;  Surgeon: Jose Alberto Urena MD;  Location: [facility];  Service: General     Family History   Problem Relation Age of Onset   • No Known Problems Father    • No Known Problems Mother      Social History     Socioeconomic History   • Marital status:      Spouse name: Not on file   • Number of children: Not on file   • Years of education: Not on file   • Highest education level: Not on file   Tobacco Use   • Smoking status: Current Every Day Smoker     Packs/day: 0.50     Types: Cigarettes   • Smokeless tobacco: Never Used   Substance and Sexual Activity   • Alcohol use: No   • Drug use: No   • Sexual activity: Never     ---------------------------------------------------------------------------------------------------------------------   Allergies:  Latex  ---------------------------------------------------------------------------------------------------------------------   Medications below are reported home medications pulling from within the system; at this time, these medications have not been reconciled unless otherwise specified and are in the verification process for further verifcation as current home medications.    Prior to Admission Medications     Prescriptions Last Dose Informant Patient Reported? Taking?    famotidine (PEPCID) 40 MG tablet 2021 Pharmacy Yes Yes    Take 40 mg by mouth 2 (two) times a day.    gabapentin (NEURONTIN) 800 MG tablet 2021 Self Yes Yes    Take 800 mg by  mouth 3 (Three) Times a Day.    levothyroxine (SYNTHROID, LEVOTHROID) 50 MCG tablet 2/9/2021 Pharmacy Yes Yes    Take 50 mcg by mouth Daily.    atenolol (TENORMIN) 25 MG tablet Unknown Self Yes No    Take 25 mg by mouth Daily. Only takes if bp is over 145/90    hydrOXYzine pamoate (VISTARIL) 50 MG capsule Unknown Pharmacy Yes No    Take 50 mg by mouth 3 (Three) Times a Day As Needed for Itching.    methocarbamol (ROBAXIN) 750 MG tablet Unknown Pharmacy Yes No    Take 750 mg by mouth 3 (Three) Times a Day As Needed for Muscle Spasms.        ---------------------------------------------------------------------------------------------------------------------    Objective     Hospital Scheduled Meds:  enoxaparin, 30 mg, Subcutaneous, Daily  nicotine, 1 patch, Transdermal, Once  sodium chloride, 3 mL, Intravenous, Q12H      Pharmacy to Dose enoxaparin (LOVENOX),         Current listed hospital scheduled medications may not yet reflect those currently placed in orders that are signed and held, awaiting patient's arrival to floor/unit.    ---------------------------------------------------------------------------------------------------------------------   Vital Signs:  Temp:  [98.7 °F (37.1 °C)] 98.7 °F (37.1 °C)  Heart Rate:  [106-130] 120  Resp:  [16-20] 20  BP: (119-164)/(66-91) 164/84  Mean Arterial Pressure (Non-Invasive) for the past 24 hrs (Last 3 readings):   Noninvasive MAP (mmHg)   02/10/21 2151 99   02/10/21 2017 99   02/10/21 2003 92     SpO2 Percentage    02/10/21 2107 02/10/21 2120 02/10/21 2151   SpO2: 98% 97% 94%     SpO2:  [93 %-100 %] 94 %  on   ;   Device (Oxygen Therapy): room air    Body mass index is 42.43 kg/m².  Wt Readings from Last 3 Encounters:   02/10/21 116 kg (255 lb)   07/16/20 118 kg (260 lb)   12/10/19 127 kg (279 lb)       ---------------------------------------------------------------------------------------------------------------------   Physical Exam:  Physical Exam  Nursing note  reviewed.   Constitutional:       General: She is awake. She is not in acute distress.     Appearance: She is well-developed. She is morbidly obese. She is not toxic-appearing.      Comments: Pleasant, sitting up in bed, no acute distress noted.  No family present at bedside.  On room air.   HENT:      Head: Normocephalic and atraumatic.      Right Ear: External ear normal.      Left Ear: External ear normal.      Nose: Nose normal.      Mouth/Throat:      Mouth: Mucous membranes are moist.      Pharynx: Oropharynx is clear.   Eyes:      Extraocular Movements: Extraocular movements intact.      Conjunctiva/sclera: Conjunctivae normal.      Pupils: Pupils are equal, round, and reactive to light.   Neck:      Musculoskeletal: Normal range of motion and neck supple.   Cardiovascular:      Rate and Rhythm: Regular rhythm. Tachycardia present.      Pulses:           Dorsalis pedis pulses are 2+ on the right side and 2+ on the left side.        Posterior tibial pulses are 2+ on the right side and 2+ on the left side.      Heart sounds: Normal heart sounds. No murmur. No friction rub. No gallop.       Comments: Patient with left AVF, patient with several dressings in place covering.   Pulmonary:      Effort: Pulmonary effort is normal. No tachypnea, accessory muscle usage or respiratory distress.      Breath sounds: Normal breath sounds and air entry. No wheezing, rhonchi or rales.      Comments: Speaks in full sentences without dyspnea  Chest:      Chest wall: No tenderness.      Comments: Right SC dialysis catheter in place, no surrounding erythema or edema noted.  Abdominal:      General: Bowel sounds are normal. There is distension (Mild).      Palpations: Abdomen is soft.      Tenderness: There is abdominal tenderness in the right upper quadrant. There is guarding (Voluntary ). There is no right CVA tenderness, left CVA tenderness or rebound. Positive signs include Medina's sign (Equivocal).      Comments: Difficult  to assess for organomegaly due to body habitus. No peritoneal signs appreciated.     Genitourinary:     Comments: No joaquin catheter in place.  Musculoskeletal:      Right lower leg: No edema.      Left lower leg: No edema.   Skin:     General: Skin is warm and dry.      Capillary Refill: Capillary refill takes less than 2 seconds.      Findings: No abscess, erythema, lesion or wound.   Neurological:      General: No focal deficit present.      Mental Status: She is alert and oriented to person, place, and time.      GCS: GCS eye subscore is 4. GCS verbal subscore is 5. GCS motor subscore is 6.      Cranial Nerves: Cranial nerves are intact.      Sensory: Sensation is intact.      Motor: Motor function is intact.      Comments: Awake and alert. Follows commands. Answers questions appropriately. Moves all extremities equally. Strength and sensation intact. No focal neuro deficit on exam.   Psychiatric:         Attention and Perception: Attention normal.         Mood and Affect: Mood and affect normal.         Speech: Speech normal.         Behavior: Behavior normal. Behavior is cooperative.         Thought Content: Thought content normal.         Cognition and Memory: Cognition and memory normal.         Judgment: Judgment normal.     ---------------------------------------------------------------------------------------------------------------------  EKG:  Pending cardiology read.  Per my interpretation, sinus tachycardia with heart rate 120 bpm, QTC within normal limits at 424 ms, no acute ST/T wave abnormality appreciated suggestive of acute ischemia.  Await final cardiology read.      Telemetry:    Sinus tachycardia 110s    I have personally reviewed the EKG/Telemetry strip  ---------------------------------------------------------------------------------------------------------------------   Results from last 7 days   Lab Units 02/10/21  2216 02/10/21  1604   TROPONIN T ng/mL <0.010 <0.010           Results from  last 7 days   Lab Units 02/10/21  1604   CRP mg/dL 13.77*   LACTATE mmol/L 0.9   WBC 10*3/mm3 8.33   HEMOGLOBIN g/dL 9.2*   HEMATOCRIT % 30.5*   MCV fL 104.8*   MCHC g/dL 30.2*   PLATELETS 10*3/mm3 239     Results from last 7 days   Lab Units 02/10/21  1604   SODIUM mmol/L 137   POTASSIUM mmol/L 4.3   MAGNESIUM mg/dL 2.0   CHLORIDE mmol/L 107   CO2 mmol/L 20.3*   BUN mg/dL 33*   CREATININE mg/dL 2.79*   EGFR IF NONAFRICN AM mL/min/1.73 18*   CALCIUM mg/dL 9.3   GLUCOSE mg/dL 113*   ALBUMIN g/dL 3.49*   BILIRUBIN mg/dL <0.2   ALK PHOS U/L 75   AST (SGOT) U/L 17   ALT (SGPT) U/L 16   Estimated Creatinine Clearance: 30.4 mL/min (A) (by C-G formula based on SCr of 2.79 mg/dL (H)).  No results found for: HGBA1C  Lab Results   Component Value Date    TSH 7.770 (H) 02/10/2021     Microbiology Results (last 10 days)     Procedure Component Value - Date/Time    COVID-19 and FLU A/B PCR - Swab, Nasopharynx [[id number]]  (Normal) Collected: 02/10/21 1735    Lab Status: Final result Specimen: Swab from Nasopharynx Updated: 02/10/21 1831     COVID19 Not Detected     Influenza A PCR Not Detected     Influenza B PCR Not Detected    Narrative:      Fact sheet for providers: https://www.fda.gov/media/159761/download    Fact sheet for patients: https://www.fda.gov/media/122583/download    Test performed by PCR.             I have personally reviewed the above laboratory results.   ---------------------------------------------------------------------------------------------------------------------  Imaging Results (Last 7 Days)     Procedure Component Value Units Date/Time    CT Abdomen Pelvis Without Contrast [[id number]] Collected: 02/10/21 1628     Updated: 02/10/21 1655    Narrative:      FINDINGS:  Lower thorax: Clear. No effusions.  Abdomen:  Liver: Homogeneous. No focal hepatic mass or ductal dilatation.  Gallbladder: No dilation or stone identified.  Pancreas: Unremarkable. No mass or ductal dilatation.  Spleen: Homogeneous. No  splenomegaly.  Adrenals: No mass.  Kidneys/ureters: No mass. No obstructive uropathy.  No evidence of  urolithiasis.  GI tract: Non-dilated. No definite wall thickening.. There is no  evidence of appendicitis  MESENTERY: No free fluid, walled off fluid collections, mesenteric  stranding, or enlarged lymph nodes   Vasculature: Evidence of atherosclerotic vascular disease  Abdominal wall: No focal hernia or mass.  Pelvis:  Bladder: No focal mass or significant wall thickening  Reproductive: Unremarkable as visualized  Bones: Arthritic change    Impression:      1. No definitive source for the patient's symptoms identified on today's  exam.  2. Other incidental findings as discussed above.  3. moderate to large volume stool     This report was finalized on 2/10/2021 4:30 PM by Dr. Caesar Ponce MD.    XR Chest 1 View [[id number]] Collected: 02/10/21 1641     Updated: 02/10/21 1643    Narrative:      FINDINGS:  There is no focal alveolar infiltrate or effusion.  The cardiac silhouette is normal. The pulmonary vasculature is  unremarkable.  There is no evidence of an acute osseous abnormality.   There are no suspicious-appearing parenchymal soft tissue nodules.    Impression:      No evidence of active or acute cardiopulmonary disease on today's chest  radiograph.     This report was finalized on 2/10/2021 4:41 PM by Dr. Caesar Ponce MD.    US Gallbladder [[id number]] Collected: 02/10/21 1630     Updated: 02/10/21 1633    Narrative:      PROCEDURE AND FINDINGS:  Sonographic imaging of the gallbladder reveals no evidence of  cholelithiasis.  There is no pericholecystic fluid.  The wall of the gallbladder measures 2.11 mm.  The common bile duct measures 2.84 mm.    Impression:      Impression:   1. Patient was not n.p.o., therefore, the gallbladder is contracted. No  convincing evidence of cholelithiasis. If suspicion is high, then  follow-up is suggested.     This report was finalized on 2/10/2021 4:31 PM by Dr. Maynard  MD Kris       I have personally reviewed the above radiology results.   ---------------------------------------------------------------------------------------------------------------------    Assessment & Plan      -RUQ abdominal pain  · Patient was not NPO for GBUS in ED, therefore the GB was contracted. GBUS with no convincing evidence of cholelithiasis, no pericholecystic fluids. CT abd/pelvis with no definitive source of patient's symptoms of abdominal pain.   · Moderate to large volume stool noted on CT, add bowel regimen    · Will keep patient NPO, repeat GBUS with NPO status.   · PRN IV antiemetics, PRN pain control  · IV PPI  · Will attempt to obtain medical records [facility] to review EGD report as well as [facility] for ED records/imaging, order placed.  · Depending on results of GBUS and record review, will consider general surgery consult.     -ESRD on HD with missed dialysis sessions   · Patient with missed HD x 1 week   · Malfunctioning AV fistula in place (clotted), patient does have right subclavian dialysis catheter that was placed on 2/9/2021 at [facility] per her report.   · Consult nephrology to schedule inpatient dialysis, input/assistance is much appreciated   · Monitor renal function and UOP closely   · Monitor electrolytes, telemetry monitoring is in place   · Repeat chem panel in AM    -Macrocytic anemia, anemia of chronic disease   • No active bleeding reported or noted   • Hemoglobin stable   • Obtain vitamin B12, folate, and vitamin D levels. Plan to supplement vitamins if found to be deficient   • Closely monitor H&H, transfuse if necessary   • Repeat CBC in AM    -Essential hypertension  · SBP moderately controlled   · Resume home antihypertensive regimen with holding parameters to prevent hypotension and/or bradycardia   · Closely monitor BP per hospital protocol, titrate medications as necessary.   · Telemetry monitoring in place     -Hypothyroidism   · TSH 7.7, obtain Free T4 and  T3   · Resume home levothyroxine dosage for now    -GERD  · PPI    -Mild hyperglycemia without hx of diabetes mellitus  • Obtain HgbA1C    -Hypoalbuminemia  · Multifactorial  · Monitor, repeat chem panel in AM    -Morbid obesity, BMI 42.43  · Affecting all aspects of care    -Ongoing tobacco abuse   · Encourage cessation   · NRT in place    -F/E/N  • No IV fluids. Replace electrolytes per protocol as necessary. NPO diet, sips with meds.     ---------------------------------------------------  DVT Prophylaxis: Lovenox   GI Prophylaxis: PPI  Activity: Up with assistance as tolerated     The patient is considered to be a high risk patient due to: Abdominal pain, HD/ESRD with missed HD sessions, HTN, morbid obesity, ongoing tobacco abuse     INPATIENT status due to the need for care which can only be reasonably provided in an hospital setting such as aggressive/expedited ancillary services and/or consultation services, the necessity for IV medications, close physician monitoring and/or the possible need for procedures.  In such, I feel patient’s risk for adverse outcomes and need for care warrant INPATIENT evaluation and predict the patient’s care encounter to likely last beyond 2 midnights.    Code Status: FULL CODE     I have discussed the patient's assessment and plan with the patient, Gavi THAYER, and attending physician DO Fozia George PA-C  Hospitalist Service -- [facility]   Pager: [phone]    02/11/21  00:19 EST    Attending Physician: Shelby Chairez DO      ---------------------------------------------------------------------------------------------------------------------

## 2021-02-11 NOTE — PROGRESS NOTES
[facility] HOSPITALISTS CROSS COVER NOTE    Patient Identification:  Name:  Mercedes Che  Age:  50 y.o.  Sex:  female  :  1970  MRN:  [id number]  Visit number:  [id number]  Primary Care Provider:  Whitney Angel APRN    Length of stay in inpatient status:  1    Brief Update     50-year-old female that was admitted earlier today by Dr. Chairez for right upper quadrant pain and missed sessions of hemodialysis.  The patient currently still has the right upper quadrant abdominal pain.  It radiates through to her back and sometimes around her right flank to her back.  It is a sharp pain and it is the worst pain that she is ever had.  It does not worsen with eating.  She denies any dysuria or hematuria.  On imaging, the patient does have constipation.  We will give her medications to try to relieve the constipation so that we can see how much pain was due to constipation how much pain is due to another medical issue.  Her gallbladder ultrasound was unremarkable; therefore, I will order a HIDA scan to look at the function of the gallbladder.  We will repeat her blood work in the morning.    Diagnoses:  -RUQ abdominal pain  -End-stage renal disease on hemodialysis with 1 week of missed hemodialysis sessions  -Macrocytic anemia due to anemia of chronic disease  -History of essential hypertension  -History of hypothyroidism  -Mild hyperglycemia without history of diabetes  -Morbid obesity, BMI 42.1 kg/m2  -Tobacco smoking addiction      Miriam Goldberg MD  [facility] Hospitalist  21  13:52 EST

## 2021-02-11 NOTE — PLAN OF CARE
Goal Outcome Evaluation:   Patient was a new admit from ED last night, had some complaints of heartburn, notified Fozia JOHNSON. No other concerns at this time, will continue to monitor.

## 2021-02-11 NOTE — PLAN OF CARE
Goal Outcome Evaluation:  Plan of Care Reviewed With: patient  Progress: no change   Patient continues to complain of abdominal pain throughout the shift.  Patient has been off the floor x 1 today to smoke.  Patient instructed on the importance of staying on the floor.  Patient stated she had to have a cigarette this morning because she was upset.  Patient received pain medication as well as anxiety medication.  She verbalizes she is feeling better.  She is off the floor for dialysis. Will continue to provide care as indicated.

## 2021-02-11 NOTE — ED NOTES
Multiple unsuccessful IV starts at this time, provider made aware. Will continue to attempt IV start shortly     Shonda Robb, RN  02/10/21 4668

## 2021-02-11 NOTE — PROGRESS NOTES
Nephrology Progress Note      Subjective /Brief history    Patient is known to have ESKD in home hemodialysis, she has clotted left arm aVF, had right IJ TDC placed due to failed declotting came in after not having dialysis for past 1 wk and abdominal pain.   She denied any chest pain or shortness of breath.     Objective       Vital signs :     Temp:  [97.9 °F (36.6 °C)-98.7 °F (37.1 °C)] 98.4 °F (36.9 °C)  Heart Rate:  [104-130] 104  Resp:  [16-22] 22  BP: (118-167)/(66-91) 167/91      Intake/Output Summary (Last 24 hours) at 2/11/2021 1437  Last data filed at 2/11/2021 1300  Gross per 24 hour   Intake 1220 ml   Output 400 ml   Net 820 ml       Physical Exam:    General Appearance : not in acute distress  Lungs : clear to auscultation, respirations regular  Heart :  regular rhythm & normal rate, normal S1, S2 and no murmur, no rub  Abdomen : normal bowel sounds, no masses, no hepatomegaly, no splenomegaly, soft non-tender and no guarding  Extremities : moves extremities well, no edema, no cyanosis and no redness  Skin :  no bleeding, bruising or rash  Neurologic :   orientated to person, place, time and situation, Grossly no focal deficits  Acess : Left upper arm AVF, no bruit and thrill  Right IJ TDC      Laboratory Data :     Albumin Albumin   Date Value Ref Range Status   02/10/2021 3.49 (L) 3.50 - 5.20 g/dL Final      Magnesium Magnesium   Date Value Ref Range Status   02/11/2021 1.9 1.6 - 2.6 mg/dL Final   02/10/2021 2.0 1.6 - 2.6 mg/dL Final          PTH               No results found for: PTH    CBC and coagulation:  Results from last 7 days   Lab Units 02/11/21  0414 02/10/21  1604   LACTATE mmol/L  --  0.9   SED RATE mm/hr  --  67*   CRP mg/dL 13.06* 13.77*   WBC 10*3/mm3 9.62 8.33   HEMOGLOBIN g/dL 9.3* 9.2*   HEMATOCRIT % 31.3* 30.5*   MCV fL 107.6* 104.8*   MCHC g/dL 29.7* 30.2*   PLATELETS 10*3/mm3 293 239     Acid/base balance:      Renal and electrolytes:  Results from last 7 days   Lab Units  02/11/21  1109 02/11/21  0414 02/10/21  1604   SODIUM mmol/L 138  --  137   POTASSIUM mmol/L 5.0  --  4.3   MAGNESIUM mg/dL  --  1.9 2.0   CHLORIDE mmol/L 107  --  107   CO2 mmol/L 21.0*  --  20.3*   BUN mg/dL 30*  --  33*   CREATININE mg/dL 2.59*  --  2.79*   EGFR IF NONAFRICN AM mL/min/1.73 19*  --  18*   CALCIUM mg/dL 9.1  --  9.3     Estimated Creatinine Clearance: 32.7 mL/min (A) (by C-G formula based on SCr of 2.59 mg/dL (H)).    Liver and pancreatic function:  Results from last 7 days   Lab Units 02/10/21  1604   ALBUMIN g/dL 3.49*   BILIRUBIN mg/dL <0.2   ALK PHOS U/L 75   AST (SGOT) U/L 17   ALT (SGPT) U/L 16   AMYLASE U/L 84   LIPASE U/L 107*         Cardiac:      Liver and pancreatic function:  Results from last 7 days   Lab Units 02/10/21  1604   ALBUMIN g/dL 3.49*   BILIRUBIN mg/dL <0.2   ALK PHOS U/L 75   AST (SGOT) U/L 17   ALT (SGPT) U/L 16   AMYLASE U/L 84   LIPASE U/L 107*       Medications :     atenolol, 25 mg, Oral, Daily  docusate sodium, 100 mg, Oral, BID  enoxaparin, 30 mg, Subcutaneous, Daily  gabapentin, 400 mg, Oral, TID  levothyroxine, 50 mcg, Oral, Daily  nicotine, 1 patch, Transdermal, Once  pantoprazole, 40 mg, Intravenous, Q AM  polyethylene glycol, 17 g, Oral, Daily  sodium chloride, 3 mL, Intravenous, Q12H      Pharmacy to Dose enoxaparin (LOVENOX),           Assessment/Plan     1. Clotted left brachicephalic AVF s/p unsuccessful declotting and right IJ TDC placement  2. ESKD on home hemodialysis  3. Right upper quadrant pain likely due to fecal impaction  4. Anemia likely due to CKD  5.  Essential hyertension    Will do dialysis today, check iron status with ferritin and will replace with dialysis if needed  Agree on getting repeat GBUS, but most likely pain is due to fecal impaction, will add stimulant laxative.       Vanesa Cardenas MD  02/11/21  14:37 EST

## 2021-02-11 NOTE — PROGRESS NOTES
Discharge Planning Assessment   Rudy     Patient Name: Mercedes Che  MRN: [id number]  Today's Date: 2/11/2021    Admit Date: 2/10/2021    Discharge Needs Assessment     Row Name 02/11/21 0902       Living Environment    Lives With  spouse    Name(s) of Who Lives With Patient  Lives at home with spouseHector    Current Living Arrangements  home/apartment/condo    Primary Care Provided by  self;spouse/significant other    Provides Primary Care For  no one    Family Caregiver if Needed  child(young), adult;spouse    Quality of Family Relationships  helpful;involved;supportive       Transition Planning    Patient/Family Anticipates Transition to  home with family    Transportation Anticipated  family or friend will provide       Discharge Needs Assessment    Equipment Currently Used at Home  oxygen        Discharge Plan     Row Name 02/11/21 0905       Plan    Plan  Pt admitted on 2/10/21.  SS received consult per [facility] for discharge planning.  SS spoke with pt on this date.  Pt lives at home with spouseHector, and plans to return home at discharge.  Pt currently does not utilize home health services.  Pt currently utilizes home 02 at 3 liters prn via [facility].  Pt's PCP is Whitney Angel.  Pt transports via private auto per family.  SS will follow and assist with discharge needs.        Continued Care and Services - Admitted Since 2/10/2021    Coordination has not been started for this encounter.         Demographic Summary     Row Name 02/11/21 0902       General Information    Admission Type  inpatient    Referral Source  nursing    Reason for Consult  discharge planning    Preferred Language  English              ZANDER StringerW

## 2021-02-12 ENCOUNTER — APPOINTMENT (OUTPATIENT)
Dept: NUCLEAR MEDICINE | Facility: HOSPITAL | Age: 51
End: 2021-02-12

## 2021-02-12 LAB
ALBUMIN SERPL-MCNC: 3.46 G/DL (ref 3.5–5.2)
ALBUMIN/GLOB SERPL: 1 G/DL
ALP SERPL-CCNC: 68 U/L (ref 39–117)
ALT SERPL W P-5'-P-CCNC: 10 U/L (ref 1–33)
ANION GAP SERPL CALCULATED.3IONS-SCNC: 9.5 MMOL/L (ref 5–15)
AST SERPL-CCNC: 15 U/L (ref 1–32)
BILIRUB SERPL-MCNC: 0.3 MG/DL (ref 0–1.2)
BUN SERPL-MCNC: 18 MG/DL (ref 6–20)
BUN/CREAT SERPL: 8.8 (ref 7–25)
CALCIUM SPEC-SCNC: 9 MG/DL (ref 8.6–10.5)
CHLORIDE SERPL-SCNC: 98 MMOL/L (ref 98–107)
CO2 SERPL-SCNC: 27.5 MMOL/L (ref 22–29)
CREAT SERPL-MCNC: 2.04 MG/DL (ref 0.57–1)
GFR SERPL CREATININE-BSD FRML MDRD: 26 ML/MIN/1.73
GLOBULIN UR ELPH-MCNC: 3.3 GM/DL
GLUCOSE SERPL-MCNC: 90 MG/DL (ref 65–99)
MAGNESIUM SERPL-MCNC: 2.6 MG/DL (ref 1.6–2.6)
PHOSPHATE SERPL-MCNC: 3.9 MG/DL (ref 2.5–4.5)
POTASSIUM SERPL-SCNC: 4.6 MMOL/L (ref 3.5–5.2)
PROT SERPL-MCNC: 6.8 G/DL (ref 6–8.5)
SODIUM SERPL-SCNC: 135 MMOL/L (ref 136–145)

## 2021-02-12 PROCEDURE — 80053 COMPREHEN METABOLIC PANEL: CPT | Performed by: PHYSICIAN ASSISTANT

## 2021-02-12 PROCEDURE — 99232 SBSQ HOSP IP/OBS MODERATE 35: CPT | Performed by: INTERNAL MEDICINE

## 2021-02-12 PROCEDURE — 78227 HEPATOBIL SYST IMAGE W/DRUG: CPT

## 2021-02-12 PROCEDURE — 25010000002 MORPHINE PER 10 MG: Performed by: PHYSICIAN ASSISTANT

## 2021-02-12 PROCEDURE — 25010000002 ENOXAPARIN PER 10 MG: Performed by: INTERNAL MEDICINE

## 2021-02-12 PROCEDURE — 84100 ASSAY OF PHOSPHORUS: CPT | Performed by: PHYSICIAN ASSISTANT

## 2021-02-12 PROCEDURE — 78227 HEPATOBIL SYST IMAGE W/DRUG: CPT | Performed by: RADIOLOGY

## 2021-02-12 PROCEDURE — 83735 ASSAY OF MAGNESIUM: CPT | Performed by: INTERNAL MEDICINE

## 2021-02-12 PROCEDURE — A9537 TC99M MEBROFENIN: HCPCS | Performed by: INTERNAL MEDICINE

## 2021-02-12 PROCEDURE — 0 TECHNETIUM TC 99M MEBROFENIN KIT: Performed by: INTERNAL MEDICINE

## 2021-02-12 PROCEDURE — 94799 UNLISTED PULMONARY SVC/PX: CPT

## 2021-02-12 PROCEDURE — 25010000002 SINCALIDE PER 5 MCG: Performed by: INTERNAL MEDICINE

## 2021-02-12 RX ORDER — LIDOCAINE 50 MG/G
2 PATCH TOPICAL
Status: DISCONTINUED | OUTPATIENT
Start: 2021-02-13 | End: 2021-02-16 | Stop reason: HOSPADM

## 2021-02-12 RX ORDER — KIT FOR THE PREPARATION OF TECHNETIUM TC 99M MEBROFENIN 45 MG/10ML
1 INJECTION, POWDER, LYOPHILIZED, FOR SOLUTION INTRAVENOUS
Status: COMPLETED | OUTPATIENT
Start: 2021-02-12 | End: 2021-02-12

## 2021-02-12 RX ORDER — NICOTINE 21 MG/24HR
1 PATCH, TRANSDERMAL 24 HOURS TRANSDERMAL
Status: DISCONTINUED | OUTPATIENT
Start: 2021-02-12 | End: 2021-02-16 | Stop reason: HOSPADM

## 2021-02-12 RX ADMIN — ENOXAPARIN SODIUM 30 MG: 30 INJECTION SUBCUTANEOUS at 09:15

## 2021-02-12 RX ADMIN — MORPHINE SULFATE 1 MG: 2 INJECTION, SOLUTION INTRAMUSCULAR; INTRAVENOUS at 16:01

## 2021-02-12 RX ADMIN — MORPHINE SULFATE 1 MG: 2 INJECTION, SOLUTION INTRAMUSCULAR; INTRAVENOUS at 23:03

## 2021-02-12 RX ADMIN — DOCUSATE SODIUM 100 MG: 100 CAPSULE ORAL at 09:14

## 2021-02-12 RX ADMIN — NICOTINE TRANSDERMAL SYSTEM 1 PATCH: 21 PATCH, EXTENDED RELEASE TRANSDERMAL at 10:46

## 2021-02-12 RX ADMIN — METHOCARBAMOL 750 MG: 750 TABLET, FILM COATED ORAL at 17:42

## 2021-02-12 RX ADMIN — POLYETHYLENE GLYCOL (3350) 17 G: 17 POWDER, FOR SOLUTION ORAL at 09:16

## 2021-02-12 RX ADMIN — LEVOTHYROXINE SODIUM 50 MCG: 50 TABLET ORAL at 09:14

## 2021-02-12 RX ADMIN — PANTOPRAZOLE SODIUM 40 MG: 40 INJECTION, POWDER, FOR SOLUTION INTRAVENOUS at 05:20

## 2021-02-12 RX ADMIN — ATENOLOL 25 MG: 25 TABLET ORAL at 09:19

## 2021-02-12 RX ADMIN — SODIUM CHLORIDE, PRESERVATIVE FREE 3 ML: 5 INJECTION INTRAVENOUS at 20:35

## 2021-02-12 RX ADMIN — GABAPENTIN 400 MG: 400 CAPSULE ORAL at 20:36

## 2021-02-12 RX ADMIN — SINCALIDE 4.4 MCG: 5 INJECTION, POWDER, LYOPHILIZED, FOR SOLUTION INTRAVENOUS at 08:06

## 2021-02-12 RX ADMIN — GABAPENTIN 400 MG: 400 CAPSULE ORAL at 16:00

## 2021-02-12 RX ADMIN — SODIUM CHLORIDE, PRESERVATIVE FREE 3 ML: 5 INJECTION INTRAVENOUS at 09:17

## 2021-02-12 RX ADMIN — MORPHINE SULFATE 1 MG: 2 INJECTION, SOLUTION INTRAMUSCULAR; INTRAVENOUS at 09:28

## 2021-02-12 RX ADMIN — DOCUSATE SODIUM 100 MG: 100 CAPSULE ORAL at 20:35

## 2021-02-12 RX ADMIN — GABAPENTIN 400 MG: 400 CAPSULE ORAL at 09:15

## 2021-02-12 RX ADMIN — MEBROFENIN 1 DOSE: 45 INJECTION, POWDER, LYOPHILIZED, FOR SOLUTION INTRAVENOUS at 07:25

## 2021-02-12 RX ADMIN — BISACODYL 10 MG: 5 TABLET, COATED ORAL at 09:14

## 2021-02-12 NOTE — PROGRESS NOTES
Nephrology Progress Note      Subjective     Pt continue to have pain right upper quadrant and higher back, did not pass stool yet.     Objective       Vital signs :     Temp:  [97.7 °F (36.5 °C)-98.9 °F (37.2 °C)] 98.4 °F (36.9 °C)  Heart Rate:  [] 87  Resp:  [18-22] 18  BP: ()/(54-91) 115/67      Intake/Output Summary (Last 24 hours) at 2/12/2021 0941  Last data filed at 2/12/2021 0400  Gross per 24 hour   Intake 1920 ml   Output 2900 ml   Net -980 ml       Physical Exam:    General Appearance : not in acute distress  Lungs : clear to auscultation, respirations regular  Heart :  regular rhythm & normal rate, normal S1, S2 and no murmur, no rub  Abdomen : normal bowel sounds, no masses, no hepatomegaly, no splenomegaly, soft non-tender and no guarding  Extremities : moves extremities well, no edema, no cyanosis and no redness  Skin :  no bleeding, bruising or rash  Neurologic :   orientated to person, place, time and situation, Grossly no focal deficits  Acess : Left upper arm AVF, no bruit and thrill  Right IJ TDC      Laboratory Data :     Albumin Albumin   Date Value Ref Range Status   02/12/2021 3.46 (L) 3.50 - 5.20 g/dL Final   02/10/2021 3.49 (L) 3.50 - 5.20 g/dL Final      Magnesium Magnesium   Date Value Ref Range Status   02/11/2021 1.9 1.6 - 2.6 mg/dL Final   02/10/2021 2.0 1.6 - 2.6 mg/dL Final          PTH               No results found for: PTH    CBC and coagulation:  Results from last 7 days   Lab Units 02/11/21  0414 02/10/21  1604   PROCALCITONIN ng/mL 0.21  --    LACTATE mmol/L  --  0.9   SED RATE mm/hr  --  67*   CRP mg/dL 13.06* 13.77*   WBC 10*3/mm3 9.62 8.33   HEMOGLOBIN g/dL 9.3* 9.2*   HEMATOCRIT % 31.3* 30.5*   MCV fL 107.6* 104.8*   MCHC g/dL 29.7* 30.2*   PLATELETS 10*3/mm3 293 239     Acid/base balance:      Renal and electrolytes:  Results from last 7 days   Lab Units 02/12/21  0221 02/11/21  1109 02/11/21  0414 02/10/21  1604   SODIUM mmol/L 135* 138  --  137   POTASSIUM  mmol/L 4.6 5.0  --  4.3   MAGNESIUM mg/dL  --   --  1.9 2.0   CHLORIDE mmol/L 98 107  --  107   CO2 mmol/L 27.5 21.0*  --  20.3*   BUN mg/dL 18 30*  --  33*   CREATININE mg/dL 2.04* 2.59*  --  2.79*   EGFR IF NONAFRICN AM mL/min/1.73 26* 19*  --  18*   CALCIUM mg/dL 9.0 9.1  --  9.3   PHOSPHORUS mg/dL 3.9  --   --   --      Estimated Creatinine Clearance: 41.3 mL/min (A) (by C-G formula based on SCr of 2.04 mg/dL (H)).    Liver and pancreatic function:  Results from last 7 days   Lab Units 02/12/21  0221 02/10/21  1604   ALBUMIN g/dL 3.46* 3.49*   BILIRUBIN mg/dL 0.3 <0.2   ALK PHOS U/L 68 75   AST (SGOT) U/L 15 17   ALT (SGPT) U/L 10 16   AMYLASE U/L  --  84   LIPASE U/L  --  107*         Cardiac:      Liver and pancreatic function:  Results from last 7 days   Lab Units 02/12/21  0221 02/10/21  1604   ALBUMIN g/dL 3.46* 3.49*   BILIRUBIN mg/dL 0.3 <0.2   ALK PHOS U/L 68 75   AST (SGOT) U/L 15 17   ALT (SGPT) U/L 10 16   AMYLASE U/L  --  84   LIPASE U/L  --  107*       Medications :     atenolol, 25 mg, Oral, Daily  bisacodyl, 10 mg, Oral, Daily  docusate sodium, 100 mg, Oral, BID  enoxaparin, 30 mg, Subcutaneous, Daily  gabapentin, 400 mg, Oral, TID  levothyroxine, 50 mcg, Oral, Daily  nicotine, 1 patch, Transdermal, Q24H  pantoprazole, 40 mg, Intravenous, Q AM  polyethylene glycol, 17 g, Oral, Daily  sodium chloride, 3 mL, Intravenous, Q12H      Pharmacy to Dose enoxaparin (LOVENOX),           Assessment/Plan     1. Clotted left brachicephalic AVF s/p unsuccessful declotting and right IJ TDC placement  2. ESKD on home hemodialysis  3. Right upper quadrant pain likely due to fecal impaction  4. Anemia likely due to CKD  5.  Essential hyertension    Pt had uneventful dialysis yesterday, will continue on TTS dialysis. Workup is pending for RUQ pain, no BM yet, will give enema and continue on stimulant laxative.   check iron status with ferritin and will replace with dialysis if needed      Vanesa Cardenas,  MD  02/12/21  09:41 EST

## 2021-02-12 NOTE — PROGRESS NOTES
Discharge Planning Assessment   Rudy     Patient Name: Mercedes Che  MRN: [id number]  Today's Date: 2/12/2021    Admit Date: 2/10/2021      Discharge Plan     Row Name 02/12/21 1019       Plan    Plan  Pt receives dialysis at home per [facility] ([facility] [phone]) five days a week.  SS will follow and assist with discharge needs.           JANINE Stringer

## 2021-02-12 NOTE — PLAN OF CARE
Patient had small bowel movement that she explained as small hard stool and soft runny stool, normal in color. Patient ambulating. PRN pain medication given. Will continue to monitor.   Goal Outcome Evaluation:     Progress: improving

## 2021-02-12 NOTE — PLAN OF CARE
Goal Outcome Evaluation:   Pt has still not had a bowel movement  yet, no other complaints or concerns tonight, will continue to monitor.

## 2021-02-13 LAB
ANION GAP SERPL CALCULATED.3IONS-SCNC: 12.2 MMOL/L (ref 5–15)
BASOPHILS # BLD AUTO: 0.06 10*3/MM3 (ref 0–0.2)
BASOPHILS NFR BLD AUTO: 0.6 % (ref 0–1.5)
BUN SERPL-MCNC: 27 MG/DL (ref 6–20)
BUN/CREAT SERPL: 9.7 (ref 7–25)
CALCIUM SPEC-SCNC: 9.2 MG/DL (ref 8.6–10.5)
CHLORIDE SERPL-SCNC: 94 MMOL/L (ref 98–107)
CO2 SERPL-SCNC: 24.8 MMOL/L (ref 22–29)
CREAT SERPL-MCNC: 2.79 MG/DL (ref 0.57–1)
DEPRECATED RDW RBC AUTO: 57.1 FL (ref 37–54)
EOSINOPHIL # BLD AUTO: 0.3 10*3/MM3 (ref 0–0.4)
EOSINOPHIL NFR BLD AUTO: 3.2 % (ref 0.3–6.2)
ERYTHROCYTE [DISTWIDTH] IN BLOOD BY AUTOMATED COUNT: 15.3 % (ref 12.3–15.4)
GFR SERPL CREATININE-BSD FRML MDRD: 18 ML/MIN/1.73
GLUCOSE SERPL-MCNC: 83 MG/DL (ref 65–99)
HCT VFR BLD AUTO: 28.4 % (ref 34–46.6)
HGB BLD-MCNC: 8.8 G/DL (ref 12–15.9)
IMM GRANULOCYTES # BLD AUTO: 0.08 10*3/MM3 (ref 0–0.05)
IMM GRANULOCYTES NFR BLD AUTO: 0.8 % (ref 0–0.5)
LYMPHOCYTES # BLD AUTO: 3.68 10*3/MM3 (ref 0.7–3.1)
LYMPHOCYTES NFR BLD AUTO: 38.7 % (ref 19.6–45.3)
MAGNESIUM SERPL-MCNC: 2.5 MG/DL (ref 1.6–2.6)
MCH RBC QN AUTO: 31.9 PG (ref 26.6–33)
MCHC RBC AUTO-ENTMCNC: 31 G/DL (ref 31.5–35.7)
MCV RBC AUTO: 102.9 FL (ref 79–97)
MONOCYTES # BLD AUTO: 0.66 10*3/MM3 (ref 0.1–0.9)
MONOCYTES NFR BLD AUTO: 6.9 % (ref 5–12)
NEUTROPHILS NFR BLD AUTO: 4.72 10*3/MM3 (ref 1.7–7)
NEUTROPHILS NFR BLD AUTO: 49.8 % (ref 42.7–76)
NRBC BLD AUTO-RTO: 0 /100 WBC (ref 0–0.2)
PHOSPHATE SERPL-MCNC: 4.2 MG/DL (ref 2.5–4.5)
PLATELET # BLD AUTO: 284 10*3/MM3 (ref 140–450)
PMV BLD AUTO: 9.5 FL (ref 6–12)
POTASSIUM SERPL-SCNC: 5 MMOL/L (ref 3.5–5.2)
RBC # BLD AUTO: 2.76 10*6/MM3 (ref 3.77–5.28)
SODIUM SERPL-SCNC: 131 MMOL/L (ref 136–145)
WBC # BLD AUTO: 9.5 10*3/MM3 (ref 3.4–10.8)

## 2021-02-13 PROCEDURE — 25010000002 MORPHINE PER 10 MG: Performed by: PHYSICIAN ASSISTANT

## 2021-02-13 PROCEDURE — 83735 ASSAY OF MAGNESIUM: CPT | Performed by: INTERNAL MEDICINE

## 2021-02-13 PROCEDURE — 99232 SBSQ HOSP IP/OBS MODERATE 35: CPT | Performed by: INTERNAL MEDICINE

## 2021-02-13 PROCEDURE — 25010000002 ENOXAPARIN PER 10 MG: Performed by: INTERNAL MEDICINE

## 2021-02-13 PROCEDURE — 84100 ASSAY OF PHOSPHORUS: CPT | Performed by: INTERNAL MEDICINE

## 2021-02-13 PROCEDURE — 85025 COMPLETE CBC W/AUTO DIFF WBC: CPT | Performed by: INTERNAL MEDICINE

## 2021-02-13 PROCEDURE — 80048 BASIC METABOLIC PNL TOTAL CA: CPT | Performed by: INTERNAL MEDICINE

## 2021-02-13 RX ORDER — POLYETHYLENE GLYCOL 3350, SODIUM CHLORIDE, POTASSIUM CHLORIDE, SODIUM BICARBONATE, AND SODIUM SULFATE 240; 5.84; 2.98; 6.72; 22.72 G/4L; G/4L; G/4L; G/4L; G/4L
2000 POWDER, FOR SOLUTION ORAL 2 TIMES DAILY
Status: DISPENSED | OUTPATIENT
Start: 2021-02-13 | End: 2021-02-14

## 2021-02-13 RX ORDER — PANTOPRAZOLE SODIUM 40 MG/1
40 TABLET, DELAYED RELEASE ORAL DAILY
Status: DISCONTINUED | OUTPATIENT
Start: 2021-02-14 | End: 2021-02-16 | Stop reason: HOSPADM

## 2021-02-13 RX ORDER — ALBUMIN (HUMAN) 12.5 G/50ML
25 SOLUTION INTRAVENOUS AS NEEDED
Status: ACTIVE | OUTPATIENT
Start: 2021-02-13 | End: 2021-02-14

## 2021-02-13 RX ORDER — MORPHINE SULFATE 2 MG/ML
1 INJECTION, SOLUTION INTRAMUSCULAR; INTRAVENOUS
Status: DISCONTINUED | OUTPATIENT
Start: 2021-02-13 | End: 2021-02-16 | Stop reason: HOSPADM

## 2021-02-13 RX ADMIN — MORPHINE SULFATE 1 MG: 2 INJECTION, SOLUTION INTRAMUSCULAR; INTRAVENOUS at 21:17

## 2021-02-13 RX ADMIN — POLYETHYLENE GLYCOL (3350) 17 G: 17 POWDER, FOR SOLUTION ORAL at 13:14

## 2021-02-13 RX ADMIN — LEVOTHYROXINE SODIUM 50 MCG: 50 TABLET ORAL at 13:14

## 2021-02-13 RX ADMIN — GABAPENTIN 400 MG: 400 CAPSULE ORAL at 17:53

## 2021-02-13 RX ADMIN — DOCUSATE SODIUM 100 MG: 100 CAPSULE ORAL at 13:13

## 2021-02-13 RX ADMIN — BISACODYL 10 MG: 5 TABLET, COATED ORAL at 13:14

## 2021-02-13 RX ADMIN — SODIUM CHLORIDE, PRESERVATIVE FREE 3 ML: 5 INJECTION INTRAVENOUS at 13:21

## 2021-02-13 RX ADMIN — ACETAMINOPHEN 650 MG: 325 TABLET ORAL at 05:35

## 2021-02-13 RX ADMIN — LIDOCAINE 2 PATCH: 50 PATCH CUTANEOUS at 13:15

## 2021-02-13 RX ADMIN — MORPHINE SULFATE 1 MG: 2 INJECTION, SOLUTION INTRAMUSCULAR; INTRAVENOUS at 13:27

## 2021-02-13 RX ADMIN — DOCUSATE SODIUM 100 MG: 100 CAPSULE ORAL at 19:34

## 2021-02-13 RX ADMIN — MORPHINE SULFATE 1 MG: 2 INJECTION, SOLUTION INTRAMUSCULAR; INTRAVENOUS at 18:00

## 2021-02-13 RX ADMIN — GABAPENTIN 400 MG: 400 CAPSULE ORAL at 19:34

## 2021-02-13 RX ADMIN — MORPHINE SULFATE 1 MG: 2 INJECTION, SOLUTION INTRAMUSCULAR; INTRAVENOUS at 06:12

## 2021-02-13 RX ADMIN — PANTOPRAZOLE SODIUM 40 MG: 40 INJECTION, POWDER, FOR SOLUTION INTRAVENOUS at 05:34

## 2021-02-13 RX ADMIN — ATENOLOL 25 MG: 25 TABLET ORAL at 13:14

## 2021-02-13 RX ADMIN — POLYETHYLENE GLYCOL 3350, SODIUM CHLORIDE, POTASSIUM CHLORIDE, SODIUM BICARBONATE, AND SODIUM SULFATE 2000 ML: 240; 5.84; 2.98; 6.72; 22.72 POWDER, FOR SOLUTION ORAL at 21:10

## 2021-02-13 RX ADMIN — SODIUM CHLORIDE 1000 ML: 9 INJECTION, SOLUTION INTRAVENOUS at 08:10

## 2021-02-13 RX ADMIN — ENOXAPARIN SODIUM 30 MG: 30 INJECTION SUBCUTANEOUS at 13:14

## 2021-02-13 RX ADMIN — NICOTINE TRANSDERMAL SYSTEM 1 PATCH: 21 PATCH, EXTENDED RELEASE TRANSDERMAL at 13:14

## 2021-02-13 NOTE — PROGRESS NOTES
Interval History:     Patient Complaints: Patient seen on dialysis at 10:20 AM.  She is tolerating dialysis well.  Other than the right upper quadrant pain and right infrascapular pain she has no other complaints.  No shortness of breath.        Vital Signs  Temp:  [97.6 °F (36.4 °C)-98.5 °F (36.9 °C)] 97.6 °F (36.4 °C)  Heart Rate:  [] 89  Resp:  [18-20] 20  BP: ()/(58-80) 149/66    Physical Exam:    General:           No distress      HEENT:  No pallor               Neck:  No JVD       Lungs:    CTA   Heart:   S1-S2 normal,  no rub       Abdomen:   Normal bowel sounds, soft non-tender, non-distended, no guarding       Extremities:  Nil edema       Skin: No petechiae       Neurologic: Cranial nerves grossly intact,  moves all extremities.        Results Review:    I reviewed the patient's new clinical results.    Lab Results (last 24 hours)     Procedure Component Value Units Date/Time    CBC & Differential [[id number]]  (Abnormal) Collected: 02/13/21 0009    Specimen: Blood Updated: 02/13/21 0043    Narrative:      The following orders were created for panel order CBC & Differential.  Procedure                               Abnormality         Status                     ---------                               -----------         ------                     CBC Auto Differential[[id number]]        Abnormal            Final result                 Please view results for these tests on the individual orders.    CBC Auto Differential [[id number]]  (Abnormal) Collected: 02/13/21 0009    Specimen: Blood Updated: 02/13/21 0043     WBC 9.50 10*3/mm3      RBC 2.76 10*6/mm3      Hemoglobin 8.8 g/dL      Hematocrit 28.4 %      .9 fL      MCH 31.9 pg      MCHC 31.0 g/dL      RDW 15.3 %      RDW-SD 57.1 fl      MPV 9.5 fL      Platelets 284 10*3/mm3      Neutrophil % 49.8 %      Lymphocyte % 38.7 %      Monocyte % 6.9 %      Eosinophil % 3.2 %      Basophil % 0.6 %      Immature Grans % 0.8 %      Neutrophils,  Absolute 4.72 10*3/mm3      Lymphocytes, Absolute 3.68 10*3/mm3      Monocytes, Absolute 0.66 10*3/mm3      Eosinophils, Absolute 0.30 10*3/mm3      Basophils, Absolute 0.06 10*3/mm3      Immature Grans, Absolute 0.08 10*3/mm3      nRBC 0.0 /100 WBC     Basic Metabolic Panel [[id number]]  (Abnormal) Collected: 02/13/21 0009    Specimen: Blood Updated: 02/13/21 0035     Glucose 83 mg/dL      BUN 27 mg/dL      Creatinine 2.79 mg/dL      Sodium 131 mmol/L      Potassium 5.0 mmol/L      Chloride 94 mmol/L      CO2 24.8 mmol/L      Calcium 9.2 mg/dL      eGFR Non African Amer 18 mL/min/1.73      BUN/Creatinine Ratio 9.7     Anion Gap 12.2 mmol/L     Narrative:      GFR Normal >60  Chronic Kidney Disease <60  Kidney Failure <15      Phosphorus [[id number]]  (Normal) Collected: 02/13/21 0009    Specimen: Blood Updated: 02/13/21 0031     Phosphorus 4.2 mg/dL     Magnesium [[id number]]  (Normal) Collected: 02/13/21 0009    Specimen: Blood Updated: 02/13/21 0031     Magnesium 2.5 mg/dL     Magnesium [[id number]]  (Normal) Collected: 02/12/21 2103    Specimen: Blood Updated: 02/12/21 2201     Magnesium 2.6 mg/dL     Blood Culture - Blood, Arm, Left [[id number]] Collected: 02/10/21 1658    Specimen: Blood from Arm, Left Updated: 02/12/21 1701     Blood Culture No growth at 2 days    Blood Culture - Blood, Arm, Right [[id number]] Collected: 02/10/21 1604    Specimen: Blood from Arm, Right Updated: 02/12/21 1701     Blood Culture No growth at 2 days          Imaging Results (Last 24 Hours)     ** No results found for the last 24 hours. **          Assessment and Plan:    1.  End-stage renal disease on dialysis  2.  Right upper quadrant pain  3.  Anemia of CKD  4.  Hypertension    Continue dialysis with ultrafiltration    Darius Stephens MD  02/13/21  12:47 EST

## 2021-02-13 NOTE — PROGRESS NOTES
[facility] HOSPITALIST PROGRESS NOTE     Patient Identification:  Name:  Mercedes Che  Age:  50 y.o.  Sex:  female  :  1970  MRN:  [id number]  Visit Number:  [id number]  ROOM: [address] [place] [address]     Primary Care Provider:  Whitney Angel APRN     Date of Admission: 2/10/2021    Length of stay in inpatient status:  2    Subjective     Chief Compliant:    Chief Complaint   Patient presents with   • Flank Pain   • Vascular Access Problem     History of Presenting Illness:  In brief, 50-year-old female that was admitted yesterday for right upper quadrant pain and missed sessions of hemodialysis.  The patient has been taking the constipation medication and some stool has come out but not a lot.  She still has the right upper quadrant pain; it is still severe and sharp and radiates to her back.  She denies chest pain, trouble breathing, coughing, nausea, vomiting, dysuria, and hematuria.  When she had the HIDA scan today, she had pain when the CCK was injected.    Objective     Current Hospital Meds:atenolol, 25 mg, Oral, Daily  bisacodyl, 10 mg, Oral, Daily  docusate sodium, 100 mg, Oral, BID  enoxaparin, 30 mg, Subcutaneous, Daily  gabapentin, 400 mg, Oral, TID  levothyroxine, 50 mcg, Oral, Daily  nicotine, 1 patch, Transdermal, Q24H  pantoprazole, 40 mg, Intravenous, Q AM  polyethylene glycol, 17 g, Oral, Daily  sodium chloride, 3 mL, Intravenous, Q12H    Pharmacy to Dose enoxaparin (LOVENOX),       Current Antimicrobial Therapy:  Anti-Infectives (From admission, onward)    None        Current Diuretic Therapy:  Diuretics (From admission, onward)    None        ----------------------------------------------------------------------------------------------------------------------  Vital Signs:  Temp:  [97.8 °F (36.6 °C)-98.7 °F (37.1 °C)] 98.3 °F (36.8 °C)  Heart Rate:  [] 102  Resp:  [18-20] 20  BP: ()/(46-71) 97/62  SpO2:  [95 %-98 %] 95 %  Device (Oxygen Therapy): room air  Body mass index is  42.1 kg/m².    Wt Readings from Last 3 Encounters:   02/12/21 115 kg (253 lb)   07/16/20 118 kg (260 lb)   12/10/19 127 kg (279 lb)     Intake & Output (last 3 days)       02/10 0701 - 02/11 0700 02/11 0701 - 02/12 0700 02/12 0701 - 02/13 0700    P.O.  1920 1320    I.V. (mL/kg) 0 (0)  0 (0)    IV Piggyback 500      Total Intake(mL/kg) 500 (4.3) 1920 (16.7) 1320 (11.5)    Urine (mL/kg/hr) 400 900 (0.3) 200 (0.1)    Other  2000     Stool  0 0    Total Output 400 2900 200    Net +100 -980 +1120           Stool Unmeasured Occurrence  0 x 1 x        Diet Regular; Renal  ----------------------------------------------------------------------------------------------------------------------  Physical Exam  Vitals signs reviewed.   Constitutional:       Appearance: Normal appearance. She is well-developed. She is obese. She is not ill-appearing.      Comments: Appears to be in moderate pain.   HENT:      Head: Normocephalic and atraumatic.      Right Ear: External ear normal.      Left Ear: External ear normal.      Nose: Nose normal.   Eyes:      General: No scleral icterus.        Right eye: No discharge.         Left eye: No discharge.      Pupils: Pupils are equal, round, and reactive to light.   Cardiovascular:      Rate and Rhythm: Normal rate and regular rhythm.      Pulses: Normal pulses.      Heart sounds: Normal heart sounds. No murmur.   Pulmonary:      Effort: No respiratory distress.      Breath sounds: Normal breath sounds. No wheezing or rales.   Abdominal:      General: Abdomen is protuberant. There is no distension.      Palpations: Abdomen is soft.      Tenderness: There is abdominal tenderness in the right upper quadrant and right lower quadrant. There is guarding and rebound.   Musculoskeletal:         General: No swelling, deformity or signs of injury.   Skin:     Coloration: Skin is not jaundiced.      Findings: No bruising, erythema or rash.   Neurological:      General: No focal deficit present.       Mental Status: She is alert and oriented to person, place, and time.   Psychiatric:         Mood and Affect: Mood normal.         Behavior: Behavior normal. Behavior is cooperative.         Thought Content: Thought content normal.         Judgment: Judgment normal.       ----------------------------------------------------------------------------------------------------------------------  Tele:  Sinus tachycardia with heart rates 90s to 115.  I have personally reviewed/looked at the telemetry strips.  ----------------------------------------------------------------------------------------------------------------------  LABS:    CBC and coagulation:  Results from last 7 days   Lab Units 02/11/21  0414 02/10/21  1604   PROCALCITONIN ng/mL 0.21  --    LACTATE mmol/L  --  0.9   SED RATE mm/hr  --  67*   CRP mg/dL 13.06* 13.77*   WBC 10*3/mm3 9.62 8.33   HEMOGLOBIN g/dL 9.3* 9.2*   HEMATOCRIT % 31.3* 30.5*   MCV fL 107.6* 104.8*   MCHC g/dL 29.7* 30.2*   PLATELETS 10*3/mm3 293 239     Renal and electrolytes:  Results from last 7 days   Lab Units 02/12/21  0221 02/11/21  1109 02/11/21  0414 02/10/21  1604   SODIUM mmol/L 135* 138  --  137   POTASSIUM mmol/L 4.6 5.0  --  4.3   MAGNESIUM mg/dL  --   --  1.9 2.0   CHLORIDE mmol/L 98 107  --  107   CO2 mmol/L 27.5 21.0*  --  20.3*   BUN mg/dL 18 30*  --  33*   CREATININE mg/dL 2.04* 2.59*  --  2.79*   EGFR IF NONAFRICN AM mL/min/1.73 26* 19*  --  18*   CALCIUM mg/dL 9.0 9.1  --  9.3   PHOSPHORUS mg/dL 3.9  --   --   --    GLUCOSE mg/dL 90 92  --  113*     Estimated Creatinine Clearance: 41.3 mL/min (A) (by C-G formula based on SCr of 2.04 mg/dL (H)).    Liver and pancreatic function:  Results from last 7 days   Lab Units 02/12/21  0221 02/10/21  1604   ALBUMIN g/dL 3.46* 3.49*   BILIRUBIN mg/dL 0.3 <0.2   ALK PHOS U/L 68 75   AST (SGOT) U/L 15 17   ALT (SGPT) U/L 10 16   AMYLASE U/L  --  84   LIPASE U/L  --  107*     Endocrine function:  Lab Results   Component Value Date     HGBA1C 4.90 02/10/2021     Glucose levels from the CMP:  Results from last 7 days   Lab Units 02/12/21  0221 02/11/21  1109 02/10/21  1604   GLUCOSE mg/dL 90 92 113*     Lab Results   Component Value Date    TSH 7.770 (H) 02/10/2021    FREET4 1.06 02/10/2021     Cardiac:  Results from last 7 days   Lab Units 02/10/21  2216 02/10/21  1604   TROPONIN T ng/mL <0.010 <0.010       Cultures:  Lab Results   Component Value Date    COLORU Yellow 02/10/2021    CLARITYU Clear 02/10/2021    PHUR <=5.0 02/10/2021    GLUCOSEU Negative 02/10/2021    KETONESU Negative 02/10/2021    BLOODU Negative 02/10/2021    NITRITEU Negative 02/10/2021    LEUKOCYTESUR Negative 02/10/2021    BILIRUBINUR Negative 02/10/2021    UROBILINOGEN 0.2 E.U./dL 02/10/2021    RBCUA 0-2 02/10/2021    WBCUA 0-2 02/10/2021    BACTERIA None Seen 02/10/2021     Microbiology Results (last 10 days)     Procedure Component Value - Date/Time    COVID-19 and FLU A/B PCR - Swab, Nasopharynx [[id number]]  (Normal) Collected: 02/10/21 1735    Lab Status: Final result Specimen: Swab from Nasopharynx Updated: 02/10/21 1831     COVID19 Not Detected     Influenza A PCR Not Detected     Influenza B PCR Not Detected    Narrative:      Fact sheet for providers: https://www.fda.gov/media/239587/download    Fact sheet for patients: https://www.fda.gov/media/746426/download    Test performed by PCR.    Blood Culture - Blood, Arm, Left [[id number]] Collected: 02/10/21 1658    Lab Status: Preliminary result Specimen: Blood from Arm, Left Updated: 02/12/21 1701     Blood Culture No growth at 2 days    Blood Culture - Blood, Arm, Right [[id number]] Collected: 02/10/21 1604    Lab Status: Preliminary result Specimen: Blood from Arm, Right Updated: 02/12/21 1701     Blood Culture No growth at 2 days        I have personally looked at the labs and they are summarized  above.  ----------------------------------------------------------------------------------------------------------------------  Detailed radiology reports for the last 24 hours:    Imaging Results (Last 24 Hours)     Procedure Component Value Units Date/Time    NM HIDA Scan With Pharmacological Intervention [[id number]] Collected: 02/12/21 0946     Updated: 02/12/21 1003    Narrative:      EXAMINATION: NM HIDA SCAN WITH PHARMACOLOGICAL INTERVENTION-      CLINICAL INDICATION: right upper quadrant pain with negative imaging;  R10.9-Unspecified abdominal pain; N18.6-End stage renal disease        COMPARISON: None available.     PROCEDURE:  6.75 mCi technetium Choletec was administered. Dynamic imaging of the  liver and biliary tree was performed at 2 minutes per frame for 21  images.     4.4 mcg of Kinevac was then injected and images were acquired at 30  seconds per frame for a total of 60 images.     FINDINGS:  There was visualization of the gallbladder within an hour after the  Choletec.     Following the Kinevac, a 29% ejection fraction was calculated.        Impression:      1. Normal visualization of the gallbladder.  2. Slightly low ejection fraction of 29%.      This report was finalized on 2/12/2021 10:01 AM by Dr. Caesar Ponce MD.           I have personally looked at the radiology images and I have read the available final report.    Assessment & Plan      -RUQ abdominal pain in a patient with known constipation  -Mildly abnormal HIDA scan  -End-stage renal disease on hemodialysis with 1 week of missed hemodialysis sessions  -Mild hyponatremia, suspect due to end-stage renal disease  -Macrocytic anemia due to anemia of chronic disease  -History of essential hypertension  -History of hypothyroidism  -Mild hyperglycemia without history of diabetes  -Morbid obesity, BMI 42.1 kg/m2  -Tobacco smoking addiction     The patient's HIDA scan is slightly abnormal; I will consider consulting general surgery after we  relieve her constipation.  If she is still having a lot of pain after the constipation is relieved then we will investigate whether the pain is from an abnormally functioning gallbladder.  However, prior to admission, the patient did not have pain with eating.  The patient received hemodialysis yesterday and nephrology is planning on a Tuesday, Thursday, Saturday dialysis schedule.  We will repeat the labs in the morning as patient will have hemodialysis tomorrow.  I anticipate discharge home in 1 to 2 days with follow-up in the outpatient setting with general surgery.    VTE Prophylaxis:   Mechanical Order History:     None      Pharmalogical Order History:      Ordered     Dose Route Frequency Stop    02/10/21 2228  enoxaparin (LOVENOX) syringe 30 mg      30 mg SC Daily --    02/10/21 2153  Pharmacy to Dose enoxaparin (LOVENOX)     Question:  Indication of use  Answer:  Prophylaxis    -- XX Continuous PRN --              Miriam Goldberg MD  [facility]  02/12/21  20:22 EST

## 2021-02-13 NOTE — PLAN OF CARE
Goal Outcome Evaluation:   Pt still hasn't had any more bowel movements tonight, had some complaints about RUQ pain, gave prn pain meds. No other complaints or concerns at this time, will continue to monitor.

## 2021-02-14 PROCEDURE — 25010000002 ONDANSETRON PER 1 MG: Performed by: PHYSICIAN ASSISTANT

## 2021-02-14 PROCEDURE — 99232 SBSQ HOSP IP/OBS MODERATE 35: CPT | Performed by: INTERNAL MEDICINE

## 2021-02-14 PROCEDURE — 99253 IP/OBS CNSLTJ NEW/EST LOW 45: CPT | Performed by: SURGERY

## 2021-02-14 PROCEDURE — 25010000002 ENOXAPARIN PER 10 MG: Performed by: INTERNAL MEDICINE

## 2021-02-14 PROCEDURE — 25010000002 MORPHINE PER 10 MG: Performed by: PHYSICIAN ASSISTANT

## 2021-02-14 RX ADMIN — POLYETHYLENE GLYCOL (3350) 17 G: 17 POWDER, FOR SOLUTION ORAL at 08:43

## 2021-02-14 RX ADMIN — MORPHINE SULFATE 1 MG: 2 INJECTION, SOLUTION INTRAMUSCULAR; INTRAVENOUS at 04:53

## 2021-02-14 RX ADMIN — DOCUSATE SODIUM 100 MG: 100 CAPSULE ORAL at 19:26

## 2021-02-14 RX ADMIN — MORPHINE SULFATE 1 MG: 2 INJECTION, SOLUTION INTRAMUSCULAR; INTRAVENOUS at 11:32

## 2021-02-14 RX ADMIN — MORPHINE SULFATE 1 MG: 2 INJECTION, SOLUTION INTRAMUSCULAR; INTRAVENOUS at 08:40

## 2021-02-14 RX ADMIN — GABAPENTIN 400 MG: 400 CAPSULE ORAL at 19:26

## 2021-02-14 RX ADMIN — MORPHINE SULFATE 1 MG: 2 INJECTION, SOLUTION INTRAMUSCULAR; INTRAVENOUS at 00:04

## 2021-02-14 RX ADMIN — SODIUM CHLORIDE, PRESERVATIVE FREE 3 ML: 5 INJECTION INTRAVENOUS at 19:26

## 2021-02-14 RX ADMIN — GABAPENTIN 400 MG: 400 CAPSULE ORAL at 15:59

## 2021-02-14 RX ADMIN — LEVOTHYROXINE SODIUM 50 MCG: 50 TABLET ORAL at 08:40

## 2021-02-14 RX ADMIN — MORPHINE SULFATE 1 MG: 2 INJECTION, SOLUTION INTRAMUSCULAR; INTRAVENOUS at 19:26

## 2021-02-14 RX ADMIN — ONDANSETRON 4 MG: 2 INJECTION INTRAMUSCULAR; INTRAVENOUS at 00:04

## 2021-02-14 RX ADMIN — GABAPENTIN 400 MG: 400 CAPSULE ORAL at 08:39

## 2021-02-14 RX ADMIN — MORPHINE SULFATE 1 MG: 2 INJECTION, SOLUTION INTRAMUSCULAR; INTRAVENOUS at 22:46

## 2021-02-14 RX ADMIN — ENOXAPARIN SODIUM 30 MG: 30 INJECTION SUBCUTANEOUS at 08:40

## 2021-02-14 RX ADMIN — DOCUSATE SODIUM 100 MG: 100 CAPSULE ORAL at 08:40

## 2021-02-14 RX ADMIN — ATENOLOL 25 MG: 25 TABLET ORAL at 08:40

## 2021-02-14 RX ADMIN — SODIUM CHLORIDE, PRESERVATIVE FREE 3 ML: 5 INJECTION INTRAVENOUS at 08:43

## 2021-02-14 RX ADMIN — LIDOCAINE 2 PATCH: 50 PATCH CUTANEOUS at 08:41

## 2021-02-14 RX ADMIN — NICOTINE TRANSDERMAL SYSTEM 1 PATCH: 21 PATCH, EXTENDED RELEASE TRANSDERMAL at 08:43

## 2021-02-14 RX ADMIN — PANTOPRAZOLE SODIUM 40 MG: 40 TABLET, DELAYED RELEASE ORAL at 08:40

## 2021-02-14 RX ADMIN — MORPHINE SULFATE 1 MG: 2 INJECTION, SOLUTION INTRAMUSCULAR; INTRAVENOUS at 15:01

## 2021-02-14 RX ADMIN — BISACODYL 10 MG: 5 TABLET, COATED ORAL at 08:40

## 2021-02-14 NOTE — PROGRESS NOTES
Interval History:     Patient Complaints: Ambulatory.  No vomiting.  No fever.  Scheduled for lap luna tomorrow.  I did personally reviewed the CT of the abdomen and not enough of the lower dorsal spine is visualized to tell if there is significant arthritis or changes.        Vital Signs  Temp:  [97.9 °F (36.6 °C)-98.6 °F (37 °C)] 98.3 °F (36.8 °C)  Heart Rate:  [] 90  Resp:  [18-20] 18  BP: (104-128)/(68-74) 124/74    Physical Exam:    General:           No distress      HEENT:  No pallor               Neck:  No JVD       Lungs:    CTA   Heart:   S1-S2 normal,  no rub       Abdomen:   Normal bowel sounds, soft non-tender, non-distended, no guarding.  Percussion tenderness over the lower ribs in the mid scapular line       Extremities:  No edema       Skin: No petechiae, no rash       Neurologic: Cranial nerves grossly intact, sensation N, moves all extremities.        Results Review:    I reviewed the patient's new clinical results.    Lab Results (last 24 hours)     Procedure Component Value Units Date/Time    Blood Culture - Blood, Arm, Left [[id number]] Collected: 02/10/21 1658    Specimen: Blood from Arm, Left Updated: 02/13/21 1701     Blood Culture No growth at 3 days    Blood Culture - Blood, Arm, Right [[id number]] Collected: 02/10/21 1604    Specimen: Blood from Arm, Right Updated: 02/13/21 1701     Blood Culture No growth at 3 days          Imaging Results (Last 24 Hours)     ** No results found for the last 24 hours. **          Assessment and Plan:       1.  End-stage renal disease on dialysis  2.  Right upper quadrant pain  3.  Anemia of CKD  4.  Hypertension     The pain is not entirely typical for galbladder issues. Imaging of the dorsal spine may help to make sure that this is not a radiculopathy as the pain has been ongoing since November and is present in a band like fashion in the low dorsal spine over the right lower ribs and moving forward in a band like fashion around D9-10  region      Darius Stephens MD  02/14/21  15:54 EST

## 2021-02-14 NOTE — CONSULTS
[facility]   Consult Note    Patient Name: Mercedes Che  : 1970  MRN: [id number]  Primary Care Physician: Whitney Angel APRN  Referring Physician: No ref. provider found  Date of admission: 2/10/2021    Subjective   Subjective     Reason for Consult/ Chief Complaint: gallbladder dysfunction    History of Present Illness She is a obese 49 yo who has Goodpasture's disease with ESRD on dialysis, and has had RUQ pain radiating to the back with some nausea for 3 months. She has had US and CT with nothing seen and had a EGD in [place] reportedly showing a hiatal hernia. She came in here with thes symptoms getting worse and had a HIDA with EF 29%. It did make her sick as well. She has a left arm AV fistula that is clotted and had a recent R IJ tunneled dialysis catheter. No prior abdominal surgery.     Review of Systems   Constitutional: Negative for activity change, appetite change, chills, fever and unexpected weight change.   HENT: Negative for congestion, facial swelling and sore throat.    Eyes: Negative for photophobia and visual disturbance.   Respiratory: Negative for chest tightness, shortness of breath and wheezing.    Cardiovascular: Negative for chest pain, palpitations and leg swelling.   Gastrointestinal: Positive for abdominal pain, nausea and vomiting. Negative for abdominal distention, anal bleeding, blood in stool, constipation, diarrhea and rectal pain.   Endocrine: Negative for cold intolerance, heat intolerance, polydipsia and polyuria.   Genitourinary: Positive for flank pain. Negative for difficulty urinating, dysuria and urgency.   Musculoskeletal: Negative for back pain and myalgias.   Skin: Negative for rash and wound.   Allergic/Immunologic: Negative for immunocompromised state.   Neurological: Negative for dizziness, seizures, syncope, light-headedness, numbness and headaches.   Hematological: Negative for adenopathy. Does not bruise/bleed easily.   Psychiatric/Behavioral:  Negative for behavioral problems and confusion. The patient is not nervous/anxious.         Personal History     Past Medical History:   Diagnosis Date   • Dialysis patient (CMS/HCC)    • Disease of thyroid gland    • Goodpasture's disease (CMS/HCC)    • Histoplasmosis    • Hypertension    • Renal disorder        Past Surgical History:   Procedure Laterality Date   • ARTERIOVENOUS FISTULA     •  SECTION     • LUNG BIOPSY     • RENAL BIOPSY     • TUBAL ABDOMINAL LIGATION     • VENOUS ACCESS DEVICE (PORT) INSERTION AND REMOVAL Right 2018    Procedure: INSERTION AND REMOVAL OF NEW TUNNELED DIAYLSIS CATHETER;  Surgeon: Jose Alberto Urena MD;  Location: [facility] OR;  Service: General   • VENOUS ACCESS DEVICE (PORT) INSERTION AND REMOVAL N/A 2018    Procedure: REMOVAL AND INSERTION OF TUNNELED DIAYLSIS CATHETER;  Surgeon: Jose Alberto Urena MD;  Location: [facility];  Service: General       Family History: family history includes No Known Problems in her father and mother. Otherwise pertinent FHx was reviewed and not pertinent to current issue.    Social History:  reports that she has been smoking cigarettes. She has been smoking about 0.50 packs per day. She has never used smokeless tobacco. She reports that she does not drink alcohol or use drugs.    Home Medications:  atenolol, famotidine, gabapentin, hydrOXYzine pamoate, levothyroxine, and methocarbamol      Allergies:  Allergies   Allergen Reactions   • Latex Hives       Objective    Objective   Vitals:  Temp:  [97.6 °F (36.4 °C)-98.6 °F (37 °C)] 98.6 °F (37 °C)  Heart Rate:  [] 105  Resp:  [18-20] 18  BP: (104-149)/(66-87) 110/68    Physical Exam  Vitals signs reviewed.   Constitutional:       General: She is not in acute distress.     Appearance: She is well-developed. She is not ill-appearing.   HENT:      Head: Normocephalic. No laceration. Hair is normal.      Right Ear: Hearing and ear canal normal.      Left Ear: Hearing and ear canal normal.       Nose: Nose normal.      Right Sinus: No maxillary sinus tenderness or frontal sinus tenderness.      Left Sinus: No maxillary sinus tenderness or frontal sinus tenderness.   Eyes:      General: Lids are normal.      Conjunctiva/sclera: Conjunctivae normal.      Pupils: Pupils are equal, round, and reactive to light.   Neck:      Musculoskeletal: Normal range of motion.      Thyroid: No thyroid mass or thyromegaly.      Vascular: No JVD.      Trachea: No tracheal tenderness or tracheal deviation.   Cardiovascular:      Rate and Rhythm: Normal rate and regular rhythm.      Heart sounds: No murmur. No gallop.    Pulmonary:      Effort: Pulmonary effort is normal.      Breath sounds: Normal breath sounds. No stridor. No wheezing.   Chest:      Chest wall: No tenderness.   Abdominal:      General: Bowel sounds are normal. There is no distension.      Palpations: Abdomen is soft. There is no mass.      Tenderness: There is abdominal tenderness. There is guarding. There is no rebound.      Hernia: No hernia is present.   Musculoskeletal:         General: No deformity.   Lymphadenopathy:      Cervical: No cervical adenopathy.      Upper Body:      Right upper body: No supraclavicular adenopathy.      Left upper body: No supraclavicular adenopathy.   Skin:     General: Skin is warm and dry.      Coloration: Skin is not pale.      Findings: No erythema or rash.   Neurological:      Mental Status: She is alert and oriented to person, place, and time.      Motor: No abnormal muscle tone.   Psychiatric:         Behavior: Behavior normal.         Thought Content: Thought content normal.         Result Review    Result Review:  I have personally reviewed the results from the time of this admission to 02/14/21 11:37 AM EST and agree with these findings:  []  Laboratory  []  Microbiology  []  Radiology  []  EKG/Telemetry   []  Cardiology/Vascular   []  Pathology  []  Old records  []  Other:  Most notable findings include:      Assessment/Plan   Assessment / Plan     Brief Patient Summary:  Mercedes Che is a 50 y.o. female who has a abnormal HIDA.     Active Hospital Problems:  Active Hospital Problems    Diagnosis   • **ESRD (end stage renal disease) (CMS/[facility])     Added automatically from request for surgery [id number]         Plan: laparoscopic cholecystectomy. I did explain to her that it is possible that not all of her symptoms are from the gallbladder, and she does want to proceed with the surgery.      Electronically signed by Jose Schaeffer MD, 02/14/21, 11:37 AM EST.

## 2021-02-14 NOTE — PLAN OF CARE
Goal Outcome Evaluation:         Pt resting in bed, alert, NAD noted. Pt to have lap luna in am. No new issues noted this shift. Cont POC.

## 2021-02-14 NOTE — PLAN OF CARE
Pt complained of severe RUQ pain that radiated to her back. PRN pain medication administered as requested. Bowel regime ordered and administered w/ multiple bowel movements noted per pt. Pt reported pain relief after multiple BM's.

## 2021-02-14 NOTE — PROGRESS NOTES
[facility] HOSPITALIST PROGRESS NOTE     Patient Identification:  Name:  Mercedes Che  Age:  50 y.o.  Sex:  female  :  1970  MRN:  [id number]  Visit Number:  [id number]  ROOM: [address] [place] [address]     Primary Care Provider:  Whitney Angel APRN     Date of Admission: 2/10/2021    Length of stay in inpatient status:  4    Subjective     Chief Compliant:    Chief Complaint   Patient presents with   • Flank Pain   • Vascular Access Problem     History of Presenting Illness:  In brief, 50-year-old female that was admitted yesterday for right upper quadrant pain and missed sessions of hemodialysis.  Imaging showed constipation and HIDA scan showed an ejection fraction of the gallbladder 29%.  Today, she has noticed that greasy foods make the RUQ pain worse and foods like fruit do not cause pain.  She denies chest pain, coughing, trouble breathing, nausea, vomiting, and diarrhea.  The patient states that the medicine for her constipation has resolved this issue.  Unfortunately, her right upper quadrant pain did not improve after she had the multiple bowel movements.  In fact, she thinks that her right upper quadrant pain is worse since she is now relieved of the lower quadrant pain.    Objective     Current Hospital Meds:atenolol, 25 mg, Oral, Daily  bisacodyl, 10 mg, Oral, Daily  docusate sodium, 100 mg, Oral, BID  enoxaparin, 30 mg, Subcutaneous, Daily  gabapentin, 400 mg, Oral, TID  levothyroxine, 50 mcg, Oral, Daily  lidocaine, 2 patch, Transdermal, Q24H  nicotine, 1 patch, Transdermal, Q24H  pantoprazole, 40 mg, Oral, Daily  polyethylene glycol, 17 g, Oral, Daily  polyethylene glycol with electrolytes, 2,000 mL, Oral, BID  sodium chloride, 3 mL, Intravenous, Q12H    Pharmacy to Dose enoxaparin (LOVENOX),       Current Antimicrobial Therapy:  Anti-Infectives (From admission, onward)    None        Current Diuretic Therapy:  Diuretics (From admission, onward)    None         ----------------------------------------------------------------------------------------------------------------------  Vital Signs:  Temp:  [97.9 °F (36.6 °C)-98.6 °F (37 °C)] 98.6 °F (37 °C)  Heart Rate:  [] 105  Resp:  [18-20] 18  BP: (104-148)/(68-87) 110/68  SpO2:  [95 %-98 %] 95 %  Device (Oxygen Therapy): room air  Body mass index is 42.6 kg/m².    Wt Readings from Last 3 Encounters:   02/14/21 116 kg (256 lb)   07/16/20 118 kg (260 lb)   12/10/19 127 kg (279 lb)     Intake & Output (last 3 days)       02/11 0701 - 02/12 0700 02/12 0701 - 02/13 0700 02/13 0701 - 02/14 0700 02/14 0701 - 02/15 0700    P.O. 1920 1920 2160     I.V. (mL/kg)  0 (0)      IV Piggyback        Total Intake(mL/kg) 1920 (16.7) 1920 (16.6) 2160 (18.6)     Urine (mL/kg/hr) 900 (0.3) 300 (0.1) 250 (0.1)     Other 2000  760     Stool 0 0      Total Output 2900 300 1010     Net -980 +1620 +1150             Urine Unmeasured Occurrence  1 x 6 x     Stool Unmeasured Occurrence 0 x 1 x          Diet Regular; Renal  NPO Diet  ----------------------------------------------------------------------------------------------------------------------  Physical Exam   Vitals signs reviewed.   Constitutional:       Appearance: Normal appearance. She is well-developed. She is obese. She is not ill-appearing.      Comments: Appears to be in moderate pain.   HENT:      Head: Normocephalic and atraumatic.      Right Ear: External ear normal.      Left Ear: External ear normal.      Nose: Nose normal.   Eyes:      General: No scleral icterus.        Right eye: No discharge.         Left eye: No discharge.      Pupils: Pupils are equal, round, and reactive to light.   Cardiovascular:      Rate and Rhythm: Normal rate and regular rhythm.      Pulses: Normal pulses.      Heart sounds: Normal heart sounds. No murmur.   Pulmonary:      Effort: No respiratory distress.      Breath sounds: Normal breath sounds. No wheezing or rales.   Abdominal:      General:  Abdomen is protuberant. There is no distension.      Palpations: Abdomen is soft.      Tenderness: There is abdominal tenderness in the right upper quadrant and right lower quadrant. There is guarding and rebound.   Musculoskeletal:         General: No swelling, deformity or signs of injury.   Skin:     Coloration: Skin is not jaundiced.      Findings: No bruising, erythema or rash.   Neurological:      General: No focal deficit present.      Mental Status: She is alert and oriented to person, place, and time.   Psychiatric:         Mood and Affect: Mood normal.         Behavior: Behavior normal. Behavior is cooperative.         Thought Content: Thought content normal.         Judgment: Judgment normal.      ----------------------------------------------------------------------------------------------------------------------  Tele:  Normal sinus rhythm with heart rates 80s to 100s.  I personally reviewed the telemetry strips.  ----------------------------------------------------------------------------------------------------------------------  LABS:    CBC and coagulation:  Results from last 7 days   Lab Units 02/13/21  0009 02/11/21  0414 02/10/21  1604   PROCALCITONIN ng/mL  --  0.21  --    LACTATE mmol/L  --   --  0.9   SED RATE mm/hr  --   --  67*   CRP mg/dL  --  13.06* 13.77*   WBC 10*3/mm3 9.50 9.62 8.33   HEMOGLOBIN g/dL 8.8* 9.3* 9.2*   HEMATOCRIT % 28.4* 31.3* 30.5*   MCV fL 102.9* 107.6* 104.8*   MCHC g/dL 31.0* 29.7* 30.2*   PLATELETS 10*3/mm3 284 293 239     Renal and electrolytes:  Results from last 7 days   Lab Units 02/13/21  0009 02/12/21  2103 02/12/21  0221 02/11/21  1109 02/11/21  0414   SODIUM mmol/L 131*  --  135* 138  --    POTASSIUM mmol/L 5.0  --  4.6 5.0  --    MAGNESIUM mg/dL 2.5 2.6  --   --  1.9   CHLORIDE mmol/L 94*  --  98 107  --    CO2 mmol/L 24.8  --  27.5 21.0*  --    BUN mg/dL 27*  --  18 30*  --    CREATININE mg/dL 2.79*  --  2.04* 2.59*  --    EGFR IF NONAFRICN AM mL/min/1.73  18*  --  26* 19*  --    CALCIUM mg/dL 9.2  --  9.0 9.1  --    PHOSPHORUS mg/dL 4.2  --  3.9  --   --    GLUCOSE mg/dL 83  --  90 92  --      Estimated Creatinine Clearance: 30.4 mL/min (A) (by C-G formula based on SCr of 2.79 mg/dL (H)).    Liver and pancreatic function:  Results from last 7 days   Lab Units 02/12/21  0221 02/10/21  1604   ALBUMIN g/dL 3.46* 3.49*   BILIRUBIN mg/dL 0.3 <0.2   ALK PHOS U/L 68 75   AST (SGOT) U/L 15 17   ALT (SGPT) U/L 10 16   AMYLASE U/L  --  84   LIPASE U/L  --  107*     Endocrine function:  Lab Results   Component Value Date    HGBA1C 4.90 02/10/2021     Glucose levels from the CMP:  Results from last 7 days   Lab Units 02/13/21  0009 02/12/21  0221 02/11/21  1109 02/10/21  1604   GLUCOSE mg/dL 83 90 92 113*     Lab Results   Component Value Date    TSH 7.770 (H) 02/10/2021    FREET4 1.06 02/10/2021     Cardiac:  Results from last 7 days   Lab Units 02/10/21  2216 02/10/21  1604   TROPONIN T ng/mL <0.010 <0.010       Cultures:  Lab Results   Component Value Date    COLORU Yellow 02/10/2021    CLARITYU Clear 02/10/2021    PHUR <=5.0 02/10/2021    GLUCOSEU Negative 02/10/2021    KETONESU Negative 02/10/2021    BLOODU Negative 02/10/2021    NITRITEU Negative 02/10/2021    LEUKOCYTESUR Negative 02/10/2021    BILIRUBINUR Negative 02/10/2021    UROBILINOGEN 0.2 E.U./dL 02/10/2021    RBCUA 0-2 02/10/2021    WBCUA 0-2 02/10/2021    BACTERIA None Seen 02/10/2021     Microbiology Results (last 10 days)     Procedure Component Value - Date/Time    COVID-19 and FLU A/B PCR - Swab, Nasopharynx [[id number]]  (Normal) Collected: 02/10/21 6092    Lab Status: Final result Specimen: Swab from Nasopharynx Updated: 02/10/21 1831     COVID19 Not Detected     Influenza A PCR Not Detected     Influenza B PCR Not Detected    Narrative:      Fact sheet for providers: https://www.fda.gov/media/406856/download    Fact sheet for patients: https://www.fda.gov/media/925459/download    Test performed by PCR.     Blood Culture - Blood, Arm, Left [[id number]] Collected: 02/10/21 1658    Lab Status: Preliminary result Specimen: Blood from Arm, Left Updated: 02/13/21 1701     Blood Culture No growth at 3 days    Blood Culture - Blood, Arm, Right [[id number]] Collected: 02/10/21 1604    Lab Status: Preliminary result Specimen: Blood from Arm, Right Updated: 02/13/21 1701     Blood Culture No growth at 3 days        I have personally looked at the labs and they are summarized above.    Assessment & Plan       -RUQ abdominal pain in a patient with known constipation   -Mildly abnormal HIDA scan  -End-stage renal disease on hemodialysis with 1 week of missed hemodialysis sessions  -Mild hyponatremia, suspect due to end-stage renal disease  -Macrocytic anemia due to anemia of chronic disease  -History of essential hypertension  -History of hypothyroidism  -Mild hyperglycemia without history of diabetes  -Morbid obesity, BMI 42.1 kg/m2  -Tobacco smoking addiction    The patient was seen by general surgery today and she will have a cholecystectomy in the morning.  She is made n.p.o. after midnight.  Hopefully, the patient be able to go home in the next day or 2 after she has her gallbladder removed.  We will repeat blood work in the morning.  Nephrology will continue with hemodialysis through the tunneled right IJ catheter.    VTE Prophylaxis:   Mechanical Order History:     None      Pharmalogical Order History:      Ordered     Dose Route Frequency Stop    02/10/21 2228  enoxaparin (LOVENOX) syringe 30 mg      30 mg SC Daily --    02/10/21 2153  Pharmacy to Dose enoxaparin (LOVENOX)     Question:  Indication of use  Answer:  Prophylaxis    -- XX Continuous PRN --              Miriam Goldberg MD  [facility]  02/14/21  12:06 EST

## 2021-02-14 NOTE — PLAN OF CARE
Goal Outcome Evaluation:Patient was resting in bed with no current complaints. Pt. Still has not had a bm. Night shift is starting goltely tonight.

## 2021-02-15 ENCOUNTER — ANESTHESIA (OUTPATIENT)
Dept: PERIOP | Facility: HOSPITAL | Age: 51
End: 2021-02-15

## 2021-02-15 ENCOUNTER — ANESTHESIA EVENT (OUTPATIENT)
Dept: PERIOP | Facility: HOSPITAL | Age: 51
End: 2021-02-15

## 2021-02-15 ENCOUNTER — APPOINTMENT (OUTPATIENT)
Dept: GENERAL RADIOLOGY | Facility: HOSPITAL | Age: 51
End: 2021-02-15

## 2021-02-15 PROBLEM — K82.8 DYSFUNCTIONAL GALLBLADDER: Status: ACTIVE | Noted: 2021-02-10

## 2021-02-15 LAB
ANION GAP SERPL CALCULATED.3IONS-SCNC: 13.5 MMOL/L (ref 5–15)
BACTERIA SPEC AEROBE CULT: NORMAL
BACTERIA SPEC AEROBE CULT: NORMAL
BUN SERPL-MCNC: 21 MG/DL (ref 6–20)
BUN/CREAT SERPL: 7.6 (ref 7–25)
CALCIUM SPEC-SCNC: 9.4 MG/DL (ref 8.6–10.5)
CHLORIDE SERPL-SCNC: 102 MMOL/L (ref 98–107)
CO2 SERPL-SCNC: 19.5 MMOL/L (ref 22–29)
CREAT SERPL-MCNC: 2.75 MG/DL (ref 0.57–1)
DEPRECATED RDW RBC AUTO: 58.5 FL (ref 37–54)
ERYTHROCYTE [DISTWIDTH] IN BLOOD BY AUTOMATED COUNT: 15 % (ref 12.3–15.4)
GFR SERPL CREATININE-BSD FRML MDRD: 18 ML/MIN/1.73
GLUCOSE SERPL-MCNC: 86 MG/DL (ref 65–99)
HCT VFR BLD AUTO: 30.3 % (ref 34–46.6)
HGB BLD-MCNC: 9.2 G/DL (ref 12–15.9)
MAGNESIUM SERPL-MCNC: 2.4 MG/DL (ref 1.6–2.6)
MCH RBC QN AUTO: 32.2 PG (ref 26.6–33)
MCHC RBC AUTO-ENTMCNC: 30.4 G/DL (ref 31.5–35.7)
MCV RBC AUTO: 105.9 FL (ref 79–97)
PHOSPHATE SERPL-MCNC: 3.3 MG/DL (ref 2.5–4.5)
PLATELET # BLD AUTO: 282 10*3/MM3 (ref 140–450)
PMV BLD AUTO: 9.5 FL (ref 6–12)
POTASSIUM SERPL-SCNC: 4.7 MMOL/L (ref 3.5–5.2)
RBC # BLD AUTO: 2.86 10*6/MM3 (ref 3.77–5.28)
SARS-COV-2 RDRP RESP QL NAA+PROBE: NORMAL
SODIUM SERPL-SCNC: 135 MMOL/L (ref 136–145)
WBC # BLD AUTO: 9.71 10*3/MM3 (ref 3.4–10.8)

## 2021-02-15 PROCEDURE — 80048 BASIC METABOLIC PNL TOTAL CA: CPT | Performed by: INTERNAL MEDICINE

## 2021-02-15 PROCEDURE — 87635 SARS-COV-2 COVID-19 AMP PRB: CPT | Performed by: SURGERY

## 2021-02-15 PROCEDURE — 25010000002 FENTANYL CITRATE (PF) 100 MCG/2ML SOLUTION: Performed by: NURSE ANESTHETIST, CERTIFIED REGISTERED

## 2021-02-15 PROCEDURE — 25010000002 NEOSTIGMINE 10 MG/10ML SOLUTION: Performed by: NURSE ANESTHETIST, CERTIFIED REGISTERED

## 2021-02-15 PROCEDURE — 83735 ASSAY OF MAGNESIUM: CPT | Performed by: INTERNAL MEDICINE

## 2021-02-15 PROCEDURE — 0FT44ZZ RESECTION OF GALLBLADDER, PERCUTANEOUS ENDOSCOPIC APPROACH: ICD-10-PCS | Performed by: SURGERY

## 2021-02-15 PROCEDURE — 25010000002 ENOXAPARIN PER 10 MG: Performed by: INTERNAL MEDICINE

## 2021-02-15 PROCEDURE — 94799 UNLISTED PULMONARY SVC/PX: CPT

## 2021-02-15 PROCEDURE — 85027 COMPLETE CBC AUTOMATED: CPT | Performed by: INTERNAL MEDICINE

## 2021-02-15 PROCEDURE — 25010000002 PROPOFOL 10 MG/ML EMULSION: Performed by: NURSE ANESTHETIST, CERTIFIED REGISTERED

## 2021-02-15 PROCEDURE — 25010000002 MIDAZOLAM PER 1 MG: Performed by: NURSE ANESTHETIST, CERTIFIED REGISTERED

## 2021-02-15 PROCEDURE — 84100 ASSAY OF PHOSPHORUS: CPT | Performed by: INTERNAL MEDICINE

## 2021-02-15 PROCEDURE — 88304 TISSUE EXAM BY PATHOLOGIST: CPT | Performed by: SURGERY

## 2021-02-15 PROCEDURE — 47562 LAPAROSCOPIC CHOLECYSTECTOMY: CPT | Performed by: SURGERY

## 2021-02-15 PROCEDURE — 99232 SBSQ HOSP IP/OBS MODERATE 35: CPT | Performed by: INTERNAL MEDICINE

## 2021-02-15 PROCEDURE — 25010000002 MORPHINE PER 10 MG: Performed by: PHYSICIAN ASSISTANT

## 2021-02-15 DEVICE — LIGACLIP 10-M/L, 10MM ENDOSCOPIC ROTATING MULTIPLE CLIP APPLIERS
Type: IMPLANTABLE DEVICE | Site: ABDOMEN | Status: FUNCTIONAL
Brand: LIGACLIP

## 2021-02-15 RX ORDER — IPRATROPIUM BROMIDE AND ALBUTEROL SULFATE 2.5; .5 MG/3ML; MG/3ML
3 SOLUTION RESPIRATORY (INHALATION) ONCE AS NEEDED
Status: DISCONTINUED | OUTPATIENT
Start: 2021-02-15 | End: 2021-02-15 | Stop reason: HOSPADM

## 2021-02-15 RX ORDER — FENTANYL CITRATE 50 UG/ML
INJECTION, SOLUTION INTRAMUSCULAR; INTRAVENOUS AS NEEDED
Status: DISCONTINUED | OUTPATIENT
Start: 2021-02-15 | End: 2021-02-15 | Stop reason: SURG

## 2021-02-15 RX ORDER — DROPERIDOL 2.5 MG/ML
0.62 INJECTION, SOLUTION INTRAMUSCULAR; INTRAVENOUS ONCE AS NEEDED
Status: DISCONTINUED | OUTPATIENT
Start: 2021-02-15 | End: 2021-02-15 | Stop reason: HOSPADM

## 2021-02-15 RX ORDER — MEPERIDINE HYDROCHLORIDE 25 MG/ML
12.5 INJECTION INTRAMUSCULAR; INTRAVENOUS; SUBCUTANEOUS
Status: DISCONTINUED | OUTPATIENT
Start: 2021-02-15 | End: 2021-02-15 | Stop reason: HOSPADM

## 2021-02-15 RX ORDER — MAGNESIUM HYDROXIDE 1200 MG/15ML
LIQUID ORAL AS NEEDED
Status: DISCONTINUED | OUTPATIENT
Start: 2021-02-15 | End: 2021-02-15 | Stop reason: HOSPADM

## 2021-02-15 RX ORDER — OXYCODONE HYDROCHLORIDE AND ACETAMINOPHEN 5; 325 MG/1; MG/1
1 TABLET ORAL ONCE AS NEEDED
Status: DISCONTINUED | OUTPATIENT
Start: 2021-02-15 | End: 2021-02-15 | Stop reason: HOSPADM

## 2021-02-15 RX ORDER — GLYCOPYRROLATE 0.2 MG/ML
INJECTION INTRAMUSCULAR; INTRAVENOUS AS NEEDED
Status: DISCONTINUED | OUTPATIENT
Start: 2021-02-15 | End: 2021-02-15 | Stop reason: SURG

## 2021-02-15 RX ORDER — PROPOFOL 10 MG/ML
VIAL (ML) INTRAVENOUS AS NEEDED
Status: DISCONTINUED | OUTPATIENT
Start: 2021-02-15 | End: 2021-02-15 | Stop reason: SURG

## 2021-02-15 RX ORDER — SODIUM CHLORIDE, SODIUM LACTATE, POTASSIUM CHLORIDE, CALCIUM CHLORIDE 600; 310; 30; 20 MG/100ML; MG/100ML; MG/100ML; MG/100ML
125 INJECTION, SOLUTION INTRAVENOUS ONCE
Status: DISCONTINUED | OUTPATIENT
Start: 2021-02-15 | End: 2021-02-15 | Stop reason: HOSPADM

## 2021-02-15 RX ORDER — NEOSTIGMINE METHYLSULFATE 1 MG/ML
INJECTION, SOLUTION INTRAVENOUS AS NEEDED
Status: DISCONTINUED | OUTPATIENT
Start: 2021-02-15 | End: 2021-02-15 | Stop reason: SURG

## 2021-02-15 RX ORDER — MIDAZOLAM HYDROCHLORIDE 1 MG/ML
INJECTION INTRAMUSCULAR; INTRAVENOUS AS NEEDED
Status: DISCONTINUED | OUTPATIENT
Start: 2021-02-15 | End: 2021-02-15 | Stop reason: SURG

## 2021-02-15 RX ORDER — SODIUM CHLORIDE, SODIUM LACTATE, POTASSIUM CHLORIDE, CALCIUM CHLORIDE 600; 310; 30; 20 MG/100ML; MG/100ML; MG/100ML; MG/100ML
100 INJECTION, SOLUTION INTRAVENOUS ONCE AS NEEDED
Status: DISCONTINUED | OUTPATIENT
Start: 2021-02-15 | End: 2021-02-15 | Stop reason: HOSPADM

## 2021-02-15 RX ORDER — SODIUM CHLORIDE 9 MG/ML
INJECTION, SOLUTION INTRAVENOUS AS NEEDED
Status: DISCONTINUED | OUTPATIENT
Start: 2021-02-15 | End: 2021-02-15 | Stop reason: HOSPADM

## 2021-02-15 RX ORDER — SODIUM CHLORIDE 0.9 % (FLUSH) 0.9 %
10 SYRINGE (ML) INJECTION EVERY 12 HOURS SCHEDULED
Status: DISCONTINUED | OUTPATIENT
Start: 2021-02-15 | End: 2021-02-15 | Stop reason: HOSPADM

## 2021-02-15 RX ORDER — FENTANYL CITRATE 50 UG/ML
50 INJECTION, SOLUTION INTRAMUSCULAR; INTRAVENOUS
Status: DISCONTINUED | OUTPATIENT
Start: 2021-02-15 | End: 2021-02-15 | Stop reason: HOSPADM

## 2021-02-15 RX ORDER — ONDANSETRON 2 MG/ML
4 INJECTION INTRAMUSCULAR; INTRAVENOUS AS NEEDED
Status: DISCONTINUED | OUTPATIENT
Start: 2021-02-15 | End: 2021-02-15 | Stop reason: HOSPADM

## 2021-02-15 RX ORDER — MIDAZOLAM HYDROCHLORIDE 1 MG/ML
2 INJECTION INTRAMUSCULAR; INTRAVENOUS
Status: DISCONTINUED | OUTPATIENT
Start: 2021-02-15 | End: 2021-02-15 | Stop reason: HOSPADM

## 2021-02-15 RX ORDER — VECURONIUM BROMIDE 1 MG/ML
INJECTION, POWDER, LYOPHILIZED, FOR SOLUTION INTRAVENOUS AS NEEDED
Status: DISCONTINUED | OUTPATIENT
Start: 2021-02-15 | End: 2021-02-15 | Stop reason: SURG

## 2021-02-15 RX ORDER — MIDAZOLAM HYDROCHLORIDE 1 MG/ML
1 INJECTION INTRAMUSCULAR; INTRAVENOUS
Status: DISCONTINUED | OUTPATIENT
Start: 2021-02-15 | End: 2021-02-15 | Stop reason: HOSPADM

## 2021-02-15 RX ORDER — SODIUM CHLORIDE 0.9 % (FLUSH) 0.9 %
10 SYRINGE (ML) INJECTION AS NEEDED
Status: DISCONTINUED | OUTPATIENT
Start: 2021-02-15 | End: 2021-02-15 | Stop reason: HOSPADM

## 2021-02-15 RX ORDER — BUPIVACAINE HYDROCHLORIDE AND EPINEPHRINE 5; 5 MG/ML; UG/ML
INJECTION, SOLUTION PERINEURAL AS NEEDED
Status: DISCONTINUED | OUTPATIENT
Start: 2021-02-15 | End: 2021-02-15 | Stop reason: HOSPADM

## 2021-02-15 RX ADMIN — LIDOCAINE 2 PATCH: 50 PATCH CUTANEOUS at 09:19

## 2021-02-15 RX ADMIN — GABAPENTIN 400 MG: 400 CAPSULE ORAL at 19:30

## 2021-02-15 RX ADMIN — GLYCOPYRROLATE 0.4 MG: 0.2 INJECTION, SOLUTION INTRAMUSCULAR; INTRAVENOUS at 11:39

## 2021-02-15 RX ADMIN — FENTANYL CITRATE 100 MCG: 50 INJECTION INTRAMUSCULAR; INTRAVENOUS at 11:09

## 2021-02-15 RX ADMIN — MORPHINE SULFATE 1 MG: 2 INJECTION, SOLUTION INTRAMUSCULAR; INTRAVENOUS at 09:38

## 2021-02-15 RX ADMIN — SODIUM CHLORIDE, PRESERVATIVE FREE 3 ML: 5 INJECTION INTRAVENOUS at 19:29

## 2021-02-15 RX ADMIN — MORPHINE SULFATE 1 MG: 2 INJECTION, SOLUTION INTRAMUSCULAR; INTRAVENOUS at 01:57

## 2021-02-15 RX ADMIN — NEOSTIGMINE 3 MG: 1 INJECTION INTRAVENOUS at 11:39

## 2021-02-15 RX ADMIN — VECURONIUM BROMIDE 3 MG: 1 INJECTION, POWDER, LYOPHILIZED, FOR SOLUTION INTRAVENOUS at 11:14

## 2021-02-15 RX ADMIN — SODIUM CHLORIDE, PRESERVATIVE FREE 3 ML: 5 INJECTION INTRAVENOUS at 09:20

## 2021-02-15 RX ADMIN — DOCUSATE SODIUM 100 MG: 100 CAPSULE ORAL at 19:29

## 2021-02-15 RX ADMIN — PROPOFOL 150 MG: 10 INJECTION, EMULSION INTRAVENOUS at 11:14

## 2021-02-15 RX ADMIN — LEVOTHYROXINE SODIUM 50 MCG: 50 TABLET ORAL at 09:18

## 2021-02-15 RX ADMIN — ACETAMINOPHEN 650 MG: 325 TABLET ORAL at 21:41

## 2021-02-15 RX ADMIN — GABAPENTIN 400 MG: 400 CAPSULE ORAL at 09:19

## 2021-02-15 RX ADMIN — PANTOPRAZOLE SODIUM 40 MG: 40 TABLET, DELAYED RELEASE ORAL at 09:18

## 2021-02-15 RX ADMIN — FENTANYL CITRATE 50 MCG: 50 INJECTION INTRAMUSCULAR; INTRAVENOUS at 12:09

## 2021-02-15 RX ADMIN — ATENOLOL 25 MG: 25 TABLET ORAL at 09:18

## 2021-02-15 RX ADMIN — NICOTINE TRANSDERMAL SYSTEM 1 PATCH: 21 PATCH, EXTENDED RELEASE TRANSDERMAL at 09:19

## 2021-02-15 RX ADMIN — MORPHINE SULFATE 1 MG: 2 INJECTION, SOLUTION INTRAMUSCULAR; INTRAVENOUS at 06:16

## 2021-02-15 RX ADMIN — MORPHINE SULFATE 1 MG: 2 INJECTION, SOLUTION INTRAMUSCULAR; INTRAVENOUS at 13:00

## 2021-02-15 RX ADMIN — MIDAZOLAM 2 MG: 1 INJECTION INTRAMUSCULAR; INTRAVENOUS at 11:09

## 2021-02-15 RX ADMIN — FENTANYL CITRATE 50 MCG: 50 INJECTION INTRAMUSCULAR; INTRAVENOUS at 11:59

## 2021-02-15 NOTE — PLAN OF CARE
Problem: Adult Inpatient Plan of Care  Goal: Absence of Hospital-Acquired Illness or Injury  Intervention: Identify and Manage Fall Risk  Recent Flowsheet Documentation  Taken 2/14/2021 1926 by Lubna Mccarthy, RN  Safety Promotion/Fall Prevention:   safety round/check completed   room organization consistent   fall prevention program maintained  Intervention: Prevent Infection  Recent Flowsheet Documentation  Taken 2/14/2021 1926 by Lubna Mccarthy, RN  Infection Prevention:   rest/sleep promoted   hand hygiene promoted  Goal: Optimal Comfort and Wellbeing  Intervention: Provide Person-Centered Care  Recent Flowsheet Documentation  Taken 2/14/2021 1926 by Lubna Mccarthy, RN  Trust Relationship/Rapport:   care explained   choices provided   emotional support provided   empathic listening provided   questions answered   questions encouraged   reassurance provided   thoughts/feelings acknowledged

## 2021-02-15 NOTE — ANESTHESIA POSTPROCEDURE EVALUATION
Patient: Mercedes Che    Procedure Summary     Date: 02/15/21 Room / Location: [facility] OR 02 /  [facility] OR    Anesthesia Start: 1109 Anesthesia Stop: 1146    Procedure: CHOLECYSTECTOMY LAPAROSCOPIC (N/A Abdomen) Diagnosis:       Dysfunctional gallbladder      (Dysfunctional gallbladder [K82.8])    Surgeon: Jose Schaeffer MD Provider: Mitch Goodman DO    Anesthesia Type: general ASA Status: 4          Anesthesia Type: general    Vitals  Vitals Value Taken Time   /91 02/15/21 1218   Temp 98.3 °F (36.8 °C) 02/15/21 1218   Pulse 81 02/15/21 1218   Resp 14 02/15/21 1218   SpO2 100 % 02/15/21 1218           Post Anesthesia Care and Evaluation    Patient location during evaluation: PHASE II  Patient participation: complete - patient participated  Level of consciousness: awake and alert  Pain score: 1  Pain management: adequate  Airway patency: patent  Anesthetic complications: No anesthetic complications  PONV Status: controlled  Cardiovascular status: acceptable  Respiratory status: acceptable  Hydration status: euvolemic  No anesthesia care post op

## 2021-02-15 NOTE — PLAN OF CARE
Goal Outcome Evaluation:        Patient had a cholecystectomy today. Procedure tolerated well. Patient is currently resting in bed. Will continue to monitor.

## 2021-02-15 NOTE — ANESTHESIA PROCEDURE NOTES
Airway  Urgency: elective    Date/Time: 2/15/2021 11:16 AM  End Time:2/15/2021 11:16 AM  Airway not difficult    General Information and Staff    Patient location during procedure: OR  CRNA: Santos Keys CRNA    Indications and Patient Condition  Indications for airway management: airway protection    Preoxygenated: yes  MILS maintained throughout  Mask difficulty assessment: 0 - not attempted    Final Airway Details  Final airway type: endotracheal airway      Successful airway: ETT  Cuffed: yes   Successful intubation technique: direct laryngoscopy  Endotracheal tube insertion site: oral  Blade: Stephanie  Blade size: 3  ETT size (mm): 7.0  Cormack-Lehane Classification: grade IIa - partial view of glottis  Placement verified by: chest auscultation, capnometry and palpation of cuff   Cuff volume (mL): 8  Measured from: lips  ETT/EBT  to lips (cm): 22  Number of attempts at approach: 1  Assessment: lips, teeth, and gum same as pre-op and atraumatic intubation

## 2021-02-15 NOTE — PROGRESS NOTES
Discharge Planning Assessment   Rudy     Patient Name: Mercedes Che  MRN: [id number]  Today's Date: 2/15/2021    Admit Date: 2/10/2021        Discharge Plan     Row Name 02/15/21 1623       Plan    Plan  Pt admitted on 2/10/21.  Pt lives at home with spouse and plans to return home at discharge.  Pt currently does not utilize home health services.  Pt currently utilizes home 02 and receives outpatient dialysis.  SS will follow.          ZANDER StringerW

## 2021-02-15 NOTE — SIGNIFICANT NOTE
Called patients  Hector at patients request to let him know that the patient had come to the OR sooner for her procedure.

## 2021-02-15 NOTE — ANESTHESIA PREPROCEDURE EVALUATION
Anesthesia Evaluation     Patient summary reviewed and Nursing notes reviewed   no history of anesthetic complications:  NPO Solid Status: > 8 hours  NPO Liquid Status: > 8 hours           Airway   Mallampati: III  TM distance: <3 FB  Neck ROM: limited  Difficult intubation highly probable, Small opening, Anterior and Large neck circumference  Dental - normal exam   (+) poor dentition    Pulmonary - normal exam    breath sounds clear to auscultation  (+) a smoker Current Smoked day of surgery, COPD,   (-) asthma  Cardiovascular - normal exam  Exercise tolerance: good (4-7 METS)    NYHA Classification: II  ECG reviewed  Patient on routine beta blocker and Beta blocker given within 24 hours of surgery  Rhythm: regular  Rate: normal    (+) hypertension,   (-) past MI, angina, CHF      Neuro/Psych- negative ROS  (-) seizures, CVA  GI/Hepatic/Renal/Endo    (+) obesity, morbid obesity, GERD,  renal disease ESRD,     Musculoskeletal     Abdominal   (+) obese,    Substance History - negative use     OB/GYN negative ob/gyn ROS         Other   arthritis, blood dyscrasia anemia,         Phys Exam Other: Dental counseling discussed                  Anesthesia Plan    ASA 4     general     intravenous induction     Anesthetic plan, all risks, benefits, and alternatives have been provided, discussed and informed consent has been obtained with: patient.    Plan discussed with CRNA.

## 2021-02-15 NOTE — OP NOTE
CHOLECYSTECTOMY LAPAROSCOPIC  Procedure Note    Mercedes Che  2/15/2021    Pre-op Diagnosis:   Dysfunctional gallbladder [K82.8]    Post-op Diagnosis: same        Procedure(s):  CHOLECYSTECTOMY LAPAROSCOPIC    Surgeon(s):  Jose Schaeffer MD    Anesthesia: General    Staff:   Circulator: Stacey Varghese RN  Scrub Person: Cora Lopez  Assistant: Jose eD La Rosa    Estimated Blood Loss: minimal    Specimens:                Order Name Source Comment Collection Info Order Time   SURGICAL PATHOLOGY EXAM Gallbladder  Collected By: Jose Schaeffer MD 2/15/2021 11:21 AM     Collection Date   2/15/2021          Collection Time   11:21 AM              Drains: * No LDAs found *    Procedure: The abdomen was prepped and draped. Two 5 mm and one 11 mm port placed. The cystic duct and artery were dissected out, clipped and divided. The gallbladder was taken off the liver with cautery and brought out the upper midline port. The cautery was used more on the liver and the area suctioned. The liver was enlarged and fatty. The gas was allowed to escape and the ports removed. They were closed with vicryl and local injected.     Findings:  Fatty liver    Complications: none   Grafts / Implants N/A    Jose Schaeffer MD     Date: 2/15/2021  Time: 11:34 EST

## 2021-02-15 NOTE — PROGRESS NOTES
Assisted By: Tricia THAYER    CC: F/U Abd pain    Interview History/HPI: Patient is complaining of a pain in her abdomen postoperatively that is a burning sensation different from the character the pain she was previously having.  She had a laparoscopic cholecystectomy this a.m. for persistent abdominal discomfort and abnormal HIDA scan.  No gallstones were noted.  Patient is not nauseated currently.      Vitals:    02/15/21 1218   BP: 152/91   Pulse: 81   Resp: 14   Temp: 98.3 °F (36.8 °C)   SpO2: 100%         Intake/Output Summary (Last 24 hours) at 2/15/2021 1300  Last data filed at 2/14/2021 2100  Gross per 24 hour   Intake 960 ml   Output --   Net 960 ml       EXAM: She is morbidly obese by BMI, she is in no distress, currently on room air and saturating adequately.  Lungs have bilateral breath sounds are clear anteriorly without rhonchi rales or wheezing, dialysis catheter noted in her right IJ area, heart is a regular rate and rhythm without murmur gallop abdomen is soft mildly distended bowel sounds are active she does have some diffuse tenderness without peritoneal signs      EKG: Image reviewed no ischemia sinus rhythm    Tele: Sinus rhythm    LABS:   Lab Results (last 48 hours)     Procedure Component Value Units Date/Time    Surgical Pathology Exam [[id number]] Collected: 02/15/21 1121    Specimen: Tissue from Gallbladder Updated: 02/15/21 1158    COVID PRE-OP / PRE-PROCEDURE SCREENING ORDER (NO ISOLATION) - Swab, Nasopharynx [[id number]]  (Normal) Collected: 02/15/21 0921    Specimen: Swab from Nasopharynx Updated: 02/15/21 1037    Narrative:      The following orders were created for panel order COVID PRE-OP / PRE-PROCEDURE SCREENING ORDER (NO ISOLATION) - Swab, Nasopharynx.  Procedure                               Abnormality         Status                     ---------                               -----------         ------                     COVID-19 ABBOTT IN-HOUS...[[id number]]  Normal               Final result                 Please view results for these tests on the individual orders.    COVID-19, ABBOTT IN-HOUSE,NASAL Swab (NO TRANSPORT MEDIA) 2 HR TAT - Swab, Nasopharynx [[id number]]  (Normal) Collected: 02/15/21 0921    Specimen: Swab from Nasopharynx Updated: 02/15/21 1037     COVID19 Presumptive Negative    Narrative:      Fact sheet for providers: https://www.fda.gov/media/551768/download     Fact sheet for patients: https://www.fda.gov/media/736803/download    Test performed by PCR.  If inconsistent with clinical signs and symptoms patient should be tested with different authorized molecular test.    Basic Metabolic Panel [[id number]]  (Abnormal) Collected: 02/15/21 0443    Specimen: Blood Updated: 02/15/21 0538     Glucose 86 mg/dL      BUN 21 mg/dL      Creatinine 2.75 mg/dL      Sodium 135 mmol/L      Potassium 4.7 mmol/L      Comment: Slight hemolysis detected by analyzer. Results may be affected.        Chloride 102 mmol/L      CO2 19.5 mmol/L      Calcium 9.4 mg/dL      eGFR Non African Amer 18 mL/min/1.73      BUN/Creatinine Ratio 7.6     Anion Gap 13.5 mmol/L     Narrative:      GFR Normal >60  Chronic Kidney Disease <60  Kidney Failure <15      Phosphorus [[id number]]  (Normal) Collected: 02/15/21 0443    Specimen: Blood Updated: 02/15/21 0513     Phosphorus 3.3 mg/dL     Magnesium [[id number]]  (Normal) Collected: 02/15/21 0443    Specimen: Blood Updated: 02/15/21 0513     Magnesium 2.4 mg/dL     CBC (No Diff) [[id number]]  (Abnormal) Collected: 02/15/21 0443    Specimen: Blood Updated: 02/15/21 0456     WBC 9.71 10*3/mm3      RBC 2.86 10*6/mm3      Hemoglobin 9.2 g/dL      Hematocrit 30.3 %      .9 fL      MCH 32.2 pg      MCHC 30.4 g/dL      RDW 15.0 %      RDW-SD 58.5 fl      MPV 9.5 fL      Platelets 282 10*3/mm3     Blood Culture - Blood, Arm, Left [[id number]] Collected: 02/10/21 1658    Specimen: Blood from Arm, Left Updated: 02/14/21 1715     Blood Culture No growth at 4 days     Blood Culture - Blood, Arm, Right [[id number]] Collected: 02/10/21 1604    Specimen: Blood from Arm, Right Updated: 02/14/21 1700     Blood Culture No growth at 4 days          Radiology:    Imaging Results (Last 72 Hours)     Procedure Component Value Units Date/Time    FL candelario keny (surgery) [[id number]] Resulted: 02/15/21 1046     Updated: 02/15/21 1046    Narrative:      This procedure was auto-finalized with no dictation required.      HIDA scan with an EF of 29%, CT abdomen negative except for moderate to large volume of stool         Assessment/Plan:   End-stage renal disease, dialysis at nephrology discretion, patient does do home dialysis.    Right upper quadrant pain, status post cholecystectomy, time will tell whether this overall improves her overall discomfort.  I have asked nursing to contact surgery for dietary order.    Hypertension, has been overall controlled, patient had some discomfort postop and is going to get narcotic analgesia, follow    DVT prophylaxis, patient has been on subcu Lovenox    Hypothyroidism with adequate replacement, continue Synthroid

## 2021-02-15 NOTE — PROGRESS NOTES
Nephrology Progress Note      Subjective     Pt has lap cholecystectomy today, doing fine post op    Objective       Vital signs :     Temp:  [97 °F (36.1 °C)-98.8 °F (37.1 °C)] 98.3 °F (36.8 °C)  Heart Rate:  [78-97] 81  Resp:  [11-20] 14  BP: (113-152)/(56-91) 152/91      Intake/Output Summary (Last 24 hours) at 2/15/2021 1250  Last data filed at 2/14/2021 2100  Gross per 24 hour   Intake 960 ml   Output --   Net 960 ml       Physical Exam:    General Appearance : not in acute distress  Lungs : clear to auscultation, respirations regular  Heart :  regular rhythm & normal rate, normal S1, S2 and no murmur, no rub  Abdomen : normal bowel sounds, no masses, no hepatomegaly, no splenomegaly, soft non-tender and no guarding  Extremities : moves extremities well, no edema, no cyanosis and no redness  Skin :  no bleeding, bruising or rash  Neurologic :   orientated to person, place, time and situation, Grossly no focal deficits  Acess : Left upper arm AVF, no bruit and thrill  Right IJ TDC      Laboratory Data :     Albumin No results found for: ALBUMIN   Magnesium Magnesium   Date Value Ref Range Status   02/15/2021 2.4 1.6 - 2.6 mg/dL Final   02/13/2021 2.5 1.6 - 2.6 mg/dL Final   02/12/2021 2.6 1.6 - 2.6 mg/dL Final          PTH               No results found for: PTH    CBC and coagulation:  Results from last 7 days   Lab Units 02/15/21  0443 02/13/21  0009 02/11/21  0414 02/10/21  1604   PROCALCITONIN ng/mL  --   --  0.21  --    LACTATE mmol/L  --   --   --  0.9   SED RATE mm/hr  --   --   --  67*   CRP mg/dL  --   --  13.06* 13.77*   WBC 10*3/mm3 9.71 9.50 9.62 8.33   HEMOGLOBIN g/dL 9.2* 8.8* 9.3* 9.2*   HEMATOCRIT % 30.3* 28.4* 31.3* 30.5*   MCV fL 105.9* 102.9* 107.6* 104.8*   MCHC g/dL 30.4* 31.0* 29.7* 30.2*   PLATELETS 10*3/mm3 282 284 293 239     Acid/base balance:      Renal and electrolytes:  Results from last 7 days   Lab Units 02/15/21  0443 02/13/21  0009 02/12/21  2103 02/12/21  0221 02/11/21  1109   02/10/21  1604   SODIUM mmol/L 135* 131*  --  135* 138  --  137   POTASSIUM mmol/L 4.7 5.0  --  4.6 5.0  --  4.3   MAGNESIUM mg/dL 2.4 2.5 2.6  --   --    < > 2.0   CHLORIDE mmol/L 102 94*  --  98 107  --  107   CO2 mmol/L 19.5* 24.8  --  27.5 21.0*  --  20.3*   BUN mg/dL 21* 27*  --  18 30*  --  33*   CREATININE mg/dL 2.75* 2.79*  --  2.04* 2.59*  --  2.79*   EGFR IF NONAFRICN AM mL/min/1.73 18* 18*  --  26* 19*  --  18*   CALCIUM mg/dL 9.4 9.2  --  9.0 9.1  --  9.3   PHOSPHORUS mg/dL 3.3 4.2  --  3.9  --   --   --     < > = values in this interval not displayed.     Estimated Creatinine Clearance: 30.9 mL/min (A) (by C-G formula based on SCr of 2.75 mg/dL (H)).    Liver and pancreatic function:  Results from last 7 days   Lab Units 02/12/21  0221 02/10/21  1604   ALBUMIN g/dL 3.46* 3.49*   BILIRUBIN mg/dL 0.3 <0.2   ALK PHOS U/L 68 75   AST (SGOT) U/L 15 17   ALT (SGPT) U/L 10 16   AMYLASE U/L  --  84   LIPASE U/L  --  107*         Cardiac:      Liver and pancreatic function:  Results from last 7 days   Lab Units 02/12/21  0221 02/10/21  1604   ALBUMIN g/dL 3.46* 3.49*   BILIRUBIN mg/dL 0.3 <0.2   ALK PHOS U/L 68 75   AST (SGOT) U/L 15 17   ALT (SGPT) U/L 10 16   AMYLASE U/L  --  84   LIPASE U/L  --  107*       Medications :     atenolol, 25 mg, Oral, Daily  [MAR Hold] bisacodyl, 10 mg, Oral, Daily  [MAR Hold] docusate sodium, 100 mg, Oral, BID  [MAR Hold] enoxaparin, 30 mg, Subcutaneous, Daily  gabapentin, 400 mg, Oral, TID  [MAR Hold] levothyroxine, 50 mcg, Oral, Daily  [MAR Hold] lidocaine, 2 patch, Transdermal, Q24H  [MAR Hold] nicotine, 1 patch, Transdermal, Q24H  [MAR Hold] pantoprazole, 40 mg, Oral, Daily  [MAR Hold] polyethylene glycol, 17 g, Oral, Daily  [MAR Hold] sodium chloride, 3 mL, Intravenous, Q12H      Pharmacy to Dose enoxaparin (LOVENOX),           Assessment/Plan     1. Clotted left brachicephalic AVF s/p unsuccessful declotting and right IJ TDC placement  2. ESKD on home hemodialysis  3. Right  upper quadrant pain  4. Anemia likely due to CKD  5. Essential hyertension    Dialysis tomorrow, follow up on iron panel to replace IV iron with dialysis.         Vanesa Cardenas MD  02/15/21  12:50 EST

## 2021-02-16 VITALS
WEIGHT: 255 LBS | HEART RATE: 87 BPM | HEIGHT: 65 IN | BODY MASS INDEX: 42.49 KG/M2 | RESPIRATION RATE: 20 BRPM | SYSTOLIC BLOOD PRESSURE: 129 MMHG | TEMPERATURE: 98.5 F | DIASTOLIC BLOOD PRESSURE: 68 MMHG | OXYGEN SATURATION: 97 %

## 2021-02-16 LAB
ALBUMIN SERPL-MCNC: 3.57 G/DL (ref 3.5–5.2)
ALBUMIN/GLOB SERPL: 1.1 G/DL
ALP SERPL-CCNC: 74 U/L (ref 39–117)
ALT SERPL W P-5'-P-CCNC: 16 U/L (ref 1–33)
ANION GAP SERPL CALCULATED.3IONS-SCNC: 11.2 MMOL/L (ref 5–15)
AST SERPL-CCNC: 26 U/L (ref 1–32)
BASOPHILS # BLD AUTO: 0.07 10*3/MM3 (ref 0–0.2)
BASOPHILS NFR BLD AUTO: 0.6 % (ref 0–1.5)
BILIRUB SERPL-MCNC: 0.2 MG/DL (ref 0–1.2)
BUN SERPL-MCNC: 20 MG/DL (ref 6–20)
BUN/CREAT SERPL: 7.2 (ref 7–25)
CALCIUM SPEC-SCNC: 9.3 MG/DL (ref 8.6–10.5)
CHLORIDE SERPL-SCNC: 101 MMOL/L (ref 98–107)
CO2 SERPL-SCNC: 23.8 MMOL/L (ref 22–29)
CREAT SERPL-MCNC: 2.77 MG/DL (ref 0.57–1)
DEPRECATED RDW RBC AUTO: 56 FL (ref 37–54)
EOSINOPHIL # BLD AUTO: 0.29 10*3/MM3 (ref 0–0.4)
EOSINOPHIL NFR BLD AUTO: 2.6 % (ref 0.3–6.2)
ERYTHROCYTE [DISTWIDTH] IN BLOOD BY AUTOMATED COUNT: 15.2 % (ref 12.3–15.4)
FERRITIN SERPL-MCNC: 990.3 NG/ML (ref 13–150)
GFR SERPL CREATININE-BSD FRML MDRD: 18 ML/MIN/1.73
GLOBULIN UR ELPH-MCNC: 3.2 GM/DL
GLUCOSE SERPL-MCNC: 92 MG/DL (ref 65–99)
HCT VFR BLD AUTO: 27.1 % (ref 34–46.6)
HGB BLD-MCNC: 8.5 G/DL (ref 12–15.9)
IMM GRANULOCYTES # BLD AUTO: 0.07 10*3/MM3 (ref 0–0.05)
IMM GRANULOCYTES NFR BLD AUTO: 0.6 % (ref 0–0.5)
IRON 24H UR-MRATE: 39 MCG/DL (ref 37–145)
IRON SATN MFR SERPL: 15 % (ref 20–50)
LYMPHOCYTES # BLD AUTO: 3.25 10*3/MM3 (ref 0.7–3.1)
LYMPHOCYTES NFR BLD AUTO: 29.6 % (ref 19.6–45.3)
MCH RBC QN AUTO: 32 PG (ref 26.6–33)
MCHC RBC AUTO-ENTMCNC: 31.4 G/DL (ref 31.5–35.7)
MCV RBC AUTO: 101.9 FL (ref 79–97)
MONOCYTES # BLD AUTO: 0.66 10*3/MM3 (ref 0.1–0.9)
MONOCYTES NFR BLD AUTO: 6 % (ref 5–12)
NEUTROPHILS NFR BLD AUTO: 6.63 10*3/MM3 (ref 1.7–7)
NEUTROPHILS NFR BLD AUTO: 60.6 % (ref 42.7–76)
NRBC BLD AUTO-RTO: 0 /100 WBC (ref 0–0.2)
PLATELET # BLD AUTO: 327 10*3/MM3 (ref 140–450)
PMV BLD AUTO: 8.7 FL (ref 6–12)
POTASSIUM SERPL-SCNC: 5 MMOL/L (ref 3.5–5.2)
PROT SERPL-MCNC: 6.8 G/DL (ref 6–8.5)
RBC # BLD AUTO: 2.66 10*6/MM3 (ref 3.77–5.28)
SODIUM SERPL-SCNC: 136 MMOL/L (ref 136–145)
TIBC SERPL-MCNC: 259 MCG/DL (ref 298–536)
TRANSFERRIN SERPL-MCNC: 174 MG/DL (ref 200–360)
WBC # BLD AUTO: 10.97 10*3/MM3 (ref 3.4–10.8)

## 2021-02-16 PROCEDURE — 82728 ASSAY OF FERRITIN: CPT | Performed by: INTERNAL MEDICINE

## 2021-02-16 PROCEDURE — 84466 ASSAY OF TRANSFERRIN: CPT | Performed by: INTERNAL MEDICINE

## 2021-02-16 PROCEDURE — 83540 ASSAY OF IRON: CPT | Performed by: INTERNAL MEDICINE

## 2021-02-16 PROCEDURE — 25010000002 ENOXAPARIN PER 10 MG: Performed by: SURGERY

## 2021-02-16 PROCEDURE — 99024 POSTOP FOLLOW-UP VISIT: CPT | Performed by: SURGERY

## 2021-02-16 PROCEDURE — 99238 HOSP IP/OBS DSCHRG MGMT 30/<: CPT | Performed by: INTERNAL MEDICINE

## 2021-02-16 PROCEDURE — 80053 COMPREHEN METABOLIC PANEL: CPT | Performed by: INTERNAL MEDICINE

## 2021-02-16 PROCEDURE — 85025 COMPLETE CBC W/AUTO DIFF WBC: CPT | Performed by: INTERNAL MEDICINE

## 2021-02-16 RX ORDER — GABAPENTIN 800 MG/1
400 TABLET ORAL 2 TIMES DAILY
Refills: 0
Start: 2021-02-16

## 2021-02-16 RX ORDER — ACETAMINOPHEN 325 MG/1
650 TABLET ORAL EVERY 6 HOURS PRN
Start: 2021-02-16

## 2021-02-16 RX ORDER — FAMOTIDINE 40 MG/1
20 TABLET, FILM COATED ORAL 2 TIMES DAILY
Start: 2021-02-16

## 2021-02-16 RX ADMIN — PANTOPRAZOLE SODIUM 40 MG: 40 TABLET, DELAYED RELEASE ORAL at 08:58

## 2021-02-16 RX ADMIN — ACETAMINOPHEN 650 MG: 325 TABLET ORAL at 06:02

## 2021-02-16 RX ADMIN — LEVOTHYROXINE SODIUM 50 MCG: 50 TABLET ORAL at 08:59

## 2021-02-16 RX ADMIN — ATENOLOL 25 MG: 25 TABLET ORAL at 08:59

## 2021-02-16 RX ADMIN — GABAPENTIN 400 MG: 400 CAPSULE ORAL at 08:58

## 2021-02-16 RX ADMIN — ENOXAPARIN SODIUM 30 MG: 30 INJECTION SUBCUTANEOUS at 09:00

## 2021-02-16 RX ADMIN — SODIUM CHLORIDE, PRESERVATIVE FREE 3 ML: 5 INJECTION INTRAVENOUS at 08:59

## 2021-02-16 NOTE — PROGRESS NOTES
Discharge Planning Assessment   [facility]     Patient Name: Mercedes Che  MRN: [id number]  Today's Date: 2/16/2021    Admit Date: 2/10/2021        Discharge Plan     Row Name 02/16/21 1233       Plan    Final Discharge Disposition Code  01 - home or self-care    Final Note  Pt to be discharged home on this date.   notified [facility] per Jose at [phone].            ZANDER StringerW

## 2021-02-16 NOTE — DISCHARGE SUMMARY
Date of admission: 2/10/2021  Date of discharge: 2/16/2021    Principal diagnosis: Abdominal discomfort presumed from dysfunctional gallbladder as evidenced by a low HIDA scan ejection fraction  Secondary diagnosis:  End-stage renal disease on chronic home dialysis  Ongoing tobacco use which she was counseled to quit  Hypertension  Hypothyroidism  Gastroesophageal reflux disease  Morbid obesity by BMI    Consultants:  Dr. Schaeffer general surgery    Procedures:  -Laparoscopic cholecystectomy performed by Dr. Schaeffer 2/15/2021  -Gallbladder ultrasound showed a contracted gallbladder however the patient was not n.p.o. repeat however showed no cholelithiasis  -CT abdomen pelvis showing moderate to large volume of stool otherwise unremarkable  -HIDA scan showing low ejection fraction 29%    Exam: Assisted by Marisol THAYER.  Patient feels good wants to go home she has had flatus although no bowel movement since surgery, she has been seen by general surgery who stated she could go home as well.  Patient states she is tolerating liquids, her diet advancement has been ordered but she has not had this as of yet.  Patient refused dialysis here this morning she states she would resume this at home.  Her tunneled dialysis catheter in the right upper chest area looks good.  Lungs have bilateral breath sounds are clear no rhonchi rales wheezing saturation 9% on room air heart regular rate and rhythm without murmur gallop abdomen is soft rounded mild diffuse tenderness without peritoneal signs bowel sounds are active extremities have trace edema.  Vital signs: 129/68, 20, 87, 98.5 monitor showing a sinus rhythm    Hospital course:  Patient was admitted with abdominal pain which she has had for several months.  This was more right upper quadrant pain, work-up previously have been negative including an hour lying EGD.  This being the case HIDA scan showed low ejection fraction, she was thought to possibly have a dysfunctional gallbladder  causing her discomfort therefore patient was taken the operating room by Dr. Schaeffer and cholecystectomy was done.  Postoperative today patient is doing well, she states she is still having some abdominal pain but different than what she is having and she relates this more to just surgical pain.  She is eager to go home she will be discharged home for outpatient follow-up.  She will resume home dialysis.  Wild this case with nephrology  today.  Patient's  as well as the patient is very comfortable with the home dialysis with the line that is in and care of this line.  Patient does smoke and was strong encouraged to quit smoking.  See chart for full details this visit.  Patient does have anemia, her anemia appears to be more consistent with chronic disease.  Most likely from her renal failure.    Diet: Cardiac    Follow-up:  PCP 1 week  Surgery 1 week  She will resume home dialysis, follow-up with nephrology as scheduled    Activity:  As tolerated    Condition: Stable    Medications:  Tylenol as needed for pain  Atenolol 25 mg to take as needed elevated blood pressure  Pepcid 20 mg twice daily  Gabapentin 400 mg twice daily  Hydroxyzine 50 mg 3 times a day as needed  Synthroid 50 mcg daily  Robaxin home dose    Less than 30-minute discharge

## 2021-02-16 NOTE — NURSING NOTE
Pt alert and oriented x4. Refused dialysis today. Tolerated regular diet for lunch, no nausea or vomiting. Some soreness in abdomen. Bandaids changed over surgical sites. Discharge education provided. Pt verbalized understanding, said she would make follow up appointments when offices are open again due to the weather. IV site removed. I escorted pt to car.

## 2021-02-16 NOTE — PLAN OF CARE
Goal Outcome Evaluation:         Pt has been resting in bed this shift. Pt has complained of pain in her abdomen where bandages are in place. Bandages clean, dry, and intact with no drainage noted. PRN pain medicine administered as ordered. Pt states no other complaints. VSS. No s/s of acute distress noted. Will cont to monitor and follow plan of care.

## 2021-02-16 NOTE — DISCHARGE INSTR - APPOINTMENTS
Pt  Has  An  Apt  With  dina Angel  For febuary 24  At 11:15  And  Dr craven   For  fesharonary  23  At  9:40

## 2021-02-16 NOTE — PROGRESS NOTES
POD#1 tremayne recinose    She has no more of her preop symptoms, just a little incisional soreness. afeb and vss. Wounds ok. She is ok for discharge and advance diet. She can follow up in a week.

## 2021-02-17 ENCOUNTER — READMISSION MANAGEMENT (OUTPATIENT)
Dept: CALL CENTER | Facility: HOSPITAL | Age: 51
End: 2021-02-17

## 2021-02-17 NOTE — OUTREACH NOTE
Prep Survey      Responses   [facility] facility patient discharged from?  [facility]   Is LACE score < 7 ?  No   Emergency Room discharge w/ pulse ox?  No   Eligibility  Readm Mgmt   Discharge diagnosis  Abdominal discomfort presumed from dysfunctional gallbladder as evidenced by a low HIDA scan ejection fraction   Does the patient have one of the following disease processes/diagnoses(primary or secondary)?  General Surgery   Does the patient have Home health ordered?  No   Is there a DME ordered?  No   General alerts for this patient  HD pt    Prep survey completed?  Yes          Constance Gallegos RN

## 2021-02-17 NOTE — DISCHARGE INSTR - LAB
Pt  Has  An  Apt  With  toro Angel  For  febuary  24  At 11:15  And  Dr craven  For febuary 23  At 9:40

## 2021-02-21 ENCOUNTER — READMISSION MANAGEMENT (OUTPATIENT)
Dept: CALL CENTER | Facility: HOSPITAL | Age: 51
End: 2021-02-21

## 2021-02-21 NOTE — OUTREACH NOTE
General Surgery Week 1 Survey      Responses   [facility] patient discharged from?  [facility]   Does the patient have one of the following disease processes/diagnoses(primary or secondary)?  General Surgery   Week 1 attempt successful?  No   Unsuccessful attempts  Attempt 1          Pat Jama RN

## 2021-02-22 ENCOUNTER — READMISSION MANAGEMENT (OUTPATIENT)
Dept: CALL CENTER | Facility: HOSPITAL | Age: 51
End: 2021-02-22

## 2021-02-22 LAB
LAB AP CASE REPORT: NORMAL
PATH REPORT.FINAL DX SPEC: NORMAL

## 2021-02-23 NOTE — OUTREACH NOTE
General Surgery Week 1 Survey      Responses   [facility] patient discharged from?  [facility]   Does the patient have one of the following disease processes/diagnoses(primary or secondary)?  General Surgery   Week 1 attempt successful?  Yes   Call start time  1806   Call end time  1811   Discharge diagnosis  Abdominal discomfort presumed from dysfunctional gallbladder as evidenced by a low HIDA scan ejection fraction   Is patient permission given to speak with other caregiver?  No   Meds reviewed with patient/caregiver?  Yes   Is the patient having any side effects they believe may be caused by any medication additions or changes?  No   Does the patient have all medications related to this admission filled (includes all antibiotics, pain medications, etc.)  Yes   Is the patient taking all medications as directed (includes completed medication regime)?  Yes   Does the patient have a follow up appointment scheduled with their surgeon?  Yes   Has the patient kept scheduled appointments due by today?  N/A   Comments  She has an appt on Thursday.    Has home health visited the patient within 72 hours of discharge?  N/A   Psychosocial issues?  No   Comments  She has family at home to help her.    Did the patient receive a copy of their discharge instructions?  Yes   Nursing interventions  Reviewed instructions with patient   What is the patient's perception of their health status since discharge?  Improving   Nursing interventions  Nurse provided patient education   Is the patient /caregiver able to teach back basic post-op care?  Continue use of incentive spirometry at least 1 week post discharge, Practice 'cough and deep breath', Drive as instructed by MD in discharge instructions, No tub bath, swimming, or hot tub until instructed by MD, Keep incision areas clean,dry and protected, Do not remove steri-strips, Lifting as instructed by MD in discharge instructions   Is the patient/caregiver able to teach back signs  and symptoms of incisional infection?  Increased redness, swelling or pain at the incisonal site, Increased drainage or bleeding, Incisional warmth, Pus or odor from incision, Fever   Is the patient/caregiver able to teach back steps to recovery at home?  Set small, achievable goals for return to baseline health, Rest and rebuild strength, gradually increase activity, Weigh daily, Practice good oral hygiene, Eat a well-balance diet, Make a list of questions for surgeon's appointment   If the patient is a current smoker, are they able to teach back resources for cessation?  Not a smoker   Additional teach back comments  She is very sore she thinks she is doing well no fever.    Week 1 call completed?  Yes          Maria G Estrada RN

## 2021-03-01 ENCOUNTER — READMISSION MANAGEMENT (OUTPATIENT)
Dept: CALL CENTER | Facility: HOSPITAL | Age: 51
End: 2021-03-01

## 2021-03-01 NOTE — OUTREACH NOTE
General Surgery Week 2 Survey      Responses   [facility] [place] patient discharged from?  [facility]   Does the patient have one of the following disease processes/diagnoses(primary or secondary)?  General Surgery   Week 2 attempt successful?  Yes   Call start time  0855   Call end time  0859   General alerts for this patient  HD pt    Discharge diagnosis  Abdominal discomfort presumed from dysfunctional gallbladder as evidenced by a low HIDA scan ejection fraction   Is patient permission given to speak with other caregiver?  No   Meds reviewed with patient/caregiver?  Yes   Is the patient having any side effects they believe may be caused by any medication additions or changes?  No   Does the patient have all medications related to this admission filled (includes all antibiotics, pain medications, etc.)  Yes   Is the patient taking all medications as directed (includes completed medication regime)?  Yes   Does the patient have a follow up appointment scheduled with their surgeon?  Yes   Has the patient kept scheduled appointments due by today?  Yes   Has home health visited the patient within 72 hours of discharge?  N/A   Psychosocial issues?  No   Did the patient receive a copy of their discharge instructions?  Yes   What is the patient's perception of their health status since discharge?  Improving   Nursing interventions  Nurse provided patient education   Is the patient/caregiver able to teach back signs and symptoms of incisional infection?  Increased redness, swelling or pain at the incisonal site, Increased drainage or bleeding, Incisional warmth, Pus or odor from incision, Fever   Is the patient/caregiver able to teach back steps to recovery at home?  Set small, achievable goals for return to baseline health, Rest and rebuild strength, gradually increase activity, Weigh daily, Practice good oral hygiene, Eat a well-balance diet, Make a list of questions for surgeon's appointment   If the patient is a current  smoker, are they able to teach back resources for cessation?  Not a smoker   Additional teach back comments  She reports she is doing well.    Week 2 call completed?  Yes          Maria G Estrada RN

## 2021-03-10 ENCOUNTER — READMISSION MANAGEMENT (OUTPATIENT)
Dept: CALL CENTER | Facility: HOSPITAL | Age: 51
End: 2021-03-10

## 2021-03-10 NOTE — OUTREACH NOTE
General Surgery Week 3 Survey      Responses   [facility] patient discharged from?  [facility]   Does the patient have one of the following disease processes/diagnoses(primary or secondary)?  General Surgery   Week 3 attempt successful?  Yes   Call start time  1236   Rescheduled  Rescheduled-pt requested [having HD]   Call end time  1237          Eboni Grijalva RN

## 2021-03-14 ENCOUNTER — READMISSION MANAGEMENT (OUTPATIENT)
Dept: CALL CENTER | Facility: HOSPITAL | Age: 51
End: 2021-03-14

## 2021-03-14 NOTE — OUTREACH NOTE
General Surgery Week 3 Survey      Responses   [facility] [place] [facility] by [facility] patient discharged from?  [facility]   Does the patient have one of the following disease processes/diagnoses(primary or secondary)?  General Surgery   Week 3 attempt successful?  Yes   Call start time  1440   Call end time  1443   General alerts for this patient  HD pt    Discharge diagnosis  Abdominal discomfort presumed from dysfunctional gallbladder as evidenced by a low HIDA scan ejection fraction   Meds reviewed with patient/caregiver?  Yes   Is the patient having any side effects they believe may be caused by any medication additions or changes?  No   Does the patient have all medications related to this admission filled (includes all antibiotics, pain medications, etc.)  Yes   Is the patient taking all medications as directed (includes completed medication regime)?  Yes   Does the patient have a follow up appointment scheduled with their surgeon?  Yes   Has the patient kept scheduled appointments due by today?  Yes   Comments  released by Surgeon   Psychosocial issues?  No   Comments  Doing well.  No concerns   Did the patient receive a copy of their discharge instructions?  Yes   Nursing interventions  Reviewed instructions with patient   What is the patient's perception of their health status since discharge?  Improving   Additional teach back comments  States incision sites are healed.  Denies any questions   Week 3 call completed?  Yes   Revoked  No further contact(revokes)-requires comment   Is the patient interested in additional calls from an ambulatory ?  NOTE:  applies to high risk patients requiring additional follow-up.  No   Graduated/Revoked comments  Pt recovered from Lap luna.  Denies any concerns/issues          Isaura Johnson RN

## 2021-03-15 ENCOUNTER — HOSPITAL ENCOUNTER (OUTPATIENT)
Facility: HOSPITAL | Age: 51
Setting detail: HOSPITAL OUTPATIENT SURGERY
Discharge: HOME | End: 2021-03-15
Attending: INTERNAL MEDICINE | Admitting: INTERNAL MEDICINE

## 2021-03-15 VITALS
RESPIRATION RATE: 16 BRPM | BODY MASS INDEX: 41.48 KG/M2 | HEIGHT: 65 IN | OXYGEN SATURATION: 98 % | TEMPERATURE: 97.4 F | WEIGHT: 249 LBS | SYSTOLIC BLOOD PRESSURE: 141 MMHG | DIASTOLIC BLOOD PRESSURE: 83 MMHG | HEART RATE: 95 BPM

## 2021-03-15 RX ORDER — CLOPIDOGREL BISULFATE 75 MG/1
75 TABLET ORAL DAILY
COMMUNITY

## 2021-03-15 RX ORDER — LIDOCAINE HYDROCHLORIDE 20 MG/ML
INJECTION, SOLUTION INFILTRATION; PERINEURAL AS NEEDED
Status: DISCONTINUED | OUTPATIENT
Start: 2021-03-15 | End: 2021-03-15 | Stop reason: HOSPADM

## 2021-03-15 NOTE — H&P
Chief complaint complication of access    Subjective     Patient is a 50 y.o. female past medical history of end-stage renal disease on  dialysis came here after patient wanted to right IJ catheter removal the patient is now doing dialysis with the left upper arm AV fistula at home    Review of Systems   Pertinent items are noted in HPI    History  Past Medical History:   Diagnosis Date   • Arthritis    • COPD (chronic obstructive pulmonary disease) (CMS/HCC)    • Dialysis patient (CMS/[facility])    • Disease of thyroid gland    • GERD (gastroesophageal reflux disease)    • Goodpasture's disease (CMS/HCC)    • Histoplasmosis    • History of transfusion    • Hypertension    • Renal disorder      Past Surgical History:   Procedure Laterality Date   • ABDOMINAL SURGERY     • ARTERIOVENOUS FISTULA     •  SECTION     • CHOLECYSTECTOMY N/A 2/15/2021    Procedure: CHOLECYSTECTOMY LAPAROSCOPIC;  Surgeon: Jose Schaeffer MD;  Location: [facility];  Service: General;  Laterality: N/A;   • ENDOSCOPY     • LUNG BIOPSY     • RENAL BIOPSY     • TUBAL ABDOMINAL LIGATION     • VENOUS ACCESS DEVICE (PORT) INSERTION AND REMOVAL Right 2018    Procedure: INSERTION AND REMOVAL OF NEW TUNNELED DIAYLSIS CATHETER;  Surgeon: Jose Alberto Urena MD;  Location: [facility];  Service: General   • VENOUS ACCESS DEVICE (PORT) INSERTION AND REMOVAL N/A 2018    Procedure: REMOVAL AND INSERTION OF TUNNELED DIAYLSIS CATHETER;  Surgeon: Jose Alberto Urena MD;  Location: [facility];  Service: General     Family History   Problem Relation Age of Onset   • No Known Problems Father    • No Known Problems Mother      Social History     Tobacco Use   • Smoking status: Current Every Day Smoker     Packs/day: 0.25     Years: 30.00     Pack years: 7.50     Types: Cigarettes   • Smokeless tobacco: Never Used   Vaping Use   • Vaping Use: Never used   Substance Use Topics   • Alcohol use: No   • Drug use: No     E-cigarette/Vaping   • E-cigarette/Vaping  Use Never User      E-cigarette/Vaping Substances     Medications Prior to Admission   Medication Sig Dispense Refill Last Dose   • acetaminophen (TYLENOL) 325 MG tablet Take 2 tablets by mouth Every 6 (Six) Hours As Needed for Mild Pain .   3/14/2021 at Unknown time   • atenolol (TENORMIN) 25 MG tablet Take 25 mg by mouth Daily. Only takes if bp is over 145/90  0 3/14/2021 at Unknown time   • clopidogrel (PLAVIX) 75 MG tablet Take 75 mg by mouth Daily.   3/14/2021 at Unknown time   • famotidine (PEPCID) 40 MG tablet Take 0.5 tablets by mouth 2 (two) times a day.   3/14/2021 at Unknown time   • gabapentin (NEURONTIN) 800 MG tablet Take 0.5 tablets by mouth 2 (Two) Times a Day.  0 3/14/2021 at Unknown time   • hydrOXYzine pamoate (VISTARIL) 50 MG capsule Take 50 mg by mouth 3 (Three) Times a Day As Needed for Itching.   Past Week at Unknown time   • levothyroxine (SYNTHROID, LEVOTHROID) 50 MCG tablet Take 50 mcg by mouth Daily.   3/14/2021 at Unknown time   • methocarbamol (ROBAXIN) 750 MG tablet Take 750 mg by mouth 3 (Three) Times a Day As Needed for Muscle Spasms.   3/14/2021 at Unknown time     Allergies:  Latex    Objective     Vital Signs  Temp:  [97.4 °F (36.3 °C)] 97.4 °F (36.3 °C)  Heart Rate:  [] 95  Resp:  [16] 16  BP: (130-141)/(82-93) 141/83    Physical Exam:      General Appearance:    Alert, cooperative, in no acute distress   Head:    Normocephalic, without obvious abnormality, atraumatic   Eyes:            Lids and lashes normal, conjunctivae and sclerae normal, no   icterus, no pallor, corneas clear, PERRLA   Ears:    Ears appear intact with no abnormalities noted   Throat:   No oral lesions, no thrush, oral mucosa moist   Neck:   No adenopathy, supple, trachea midline, no thyromegaly, no     carotid bruit, no JVD   Back:     No kyphosis present, no scoliosis present, no skin lesions,       erythema or scars, no tenderness to percussion or                   palpation,   range of motion normal    Lungs:     Clear to auscultation,respirations regular, even and                   unlabored    Heart:    Regular rhythm and normal rate, normal S1 and S2, no            murmur, no gallop, no rub, no click   Breast Exam:    Deferred   Abdomen:     Normal bowel sounds, no masses, no organomegaly, soft        non-tender, non-distended, no guarding, no rebound                 tenderness   Genitalia:    Deferred   Extremities:   Moves all extremities well, no edema, no cyanosis, no              redness   Pulses:   Pulses palpable and equal bilaterally   Skin:   No bleeding, bruising or rash   Lymph nodes:   No palpable adenopathy   Neurologic:   Cranial nerves 2 - 12 grossly intact, sensation intact, DTR        present and equal bilaterally   Right IJ hemodialysis catheter catheter    Results Review:    I reviewed the patient's new clinical results.    Assessment/Plan     1-complication of access: Patient has a mature AV fistula despite been asked to remove the dialysis catheter which she was using    2-end-stage renal disease      I discussed the patients findings and my recommendations with patient and nursing staff.     DON Bedolla MD  03/15/21  14:46 EDT

## 2021-03-16 ENCOUNTER — BULK ORDERING (OUTPATIENT)
Dept: CASE MANAGEMENT | Facility: OTHER | Age: 51
End: 2021-03-16

## 2021-03-16 DIAGNOSIS — Z23 IMMUNIZATION DUE: ICD-10-CM

## 2021-12-12 NOTE — PROGRESS NOTES
[facility] HOSPITALIST PROGRESS NOTE     Patient Identification:  Name:  Mercedes Che  Age:  50 y.o.  Sex:  female  :  1970  MRN:  [id number]  Visit Number:  [id number]  ROOM: [address] [place] [address]     Primary Care Provider:  Whitney Angel APRN     Date of Admission: 2/10/2021    Length of stay in inpatient status:  3    Subjective     Chief Compliant:    Chief Complaint   Patient presents with   • Flank Pain   • Vascular Access Problem     History of Presenting Illness:  In brief, 50-year-old female that was admitted yesterday for right upper quadrant pain and missed sessions of hemodialysis.  Imaging showed constipation and HIDA scan showed an ejection fraction of the gallbladder 29%.  Today, patient had hemodialysis.  However, she developed nausea when she ate her lunch; she did not feel the nausea when she ate the salad and fruit portion but when she ate the portion that had cheese and meat on it.  Thinking back, the patient states that she has been avoiding some foods because they made her sick at her stomach.  After she ate her lunch, the right upper quadrant pain returned.  Despite the constipation medications, the patient has not been able to have a large bowel movement yet, just a small one.    Objective     Current Hospital Meds:atenolol, 25 mg, Oral, Daily  bisacodyl, 10 mg, Oral, Daily  docusate sodium, 100 mg, Oral, BID  enoxaparin, 30 mg, Subcutaneous, Daily  gabapentin, 400 mg, Oral, TID  levothyroxine, 50 mcg, Oral, Daily  lidocaine, 2 patch, Transdermal, Q24H  nicotine, 1 patch, Transdermal, Q24H  pantoprazole, 40 mg, Intravenous, Q AM  polyethylene glycol, 17 g, Oral, Daily  sodium chloride, 3 mL, Intravenous, Q12H    Pharmacy to Dose enoxaparin (LOVENOX),       Current Antimicrobial Therapy:  Anti-Infectives (From admission, onward)    None        Current Diuretic Therapy:  Diuretics (From admission, onward)    None         ----------------------------------------------------------------------------------------------------------------------  Vital Signs:  Temp:  [97.6 °F (36.4 °C)-98.5 °F (36.9 °C)] 98.4 °F (36.9 °C)  Heart Rate:  [] 90  Resp:  [18-20] 20  BP: (101-149)/(58-87) 128/74  SpO2:  [92 %-98 %] 95 %  Device (Oxygen Therapy): room air  Body mass index is 42.7 kg/m².    Wt Readings from Last 3 Encounters:   02/13/21 116 kg (256 lb 9.6 oz)   07/16/20 118 kg (260 lb)   12/10/19 127 kg (279 lb)     Intake & Output (last 3 days)       02/11 0701 - 02/12 0700 02/12 0701 - 02/13 0700 02/13 0701 - 02/14 0700    P.O. 1920 1920 840    I.V. (mL/kg)  0 (0)     IV Piggyback       Total Intake(mL/kg) 1920 (16.7) 1920 (16.6) 840 (7.2)    Urine (mL/kg/hr) 900 (0.3) 300 (0.1) 250 (0.2)    Other 2000  760    Stool 0 0     Total Output 2900 300 1010    Net -980 +1620 -170           Urine Unmeasured Occurrence  1 x     Stool Unmeasured Occurrence 0 x 1 x         Diet Regular; Renal  ----------------------------------------------------------------------------------------------------------------------  Physical Exam; unchanged compared to yesterday.  Vitals signs reviewed.   Constitutional:       Appearance: Normal appearance. She is well-developed. She is obese. She is not ill-appearing.      Comments: Appears to be in moderate pain.   HENT:      Head: Normocephalic and atraumatic.      Right Ear: External ear normal.      Left Ear: External ear normal.      Nose: Nose normal.   Eyes:      General: No scleral icterus.        Right eye: No discharge.         Left eye: No discharge.      Pupils: Pupils are equal, round, and reactive to light.   Cardiovascular:      Rate and Rhythm: Normal rate and regular rhythm.      Pulses: Normal pulses.      Heart sounds: Normal heart sounds. No murmur.   Pulmonary:      Effort: No respiratory distress.      Breath sounds: Normal breath sounds. No wheezing or rales.   Abdominal:      General: Abdomen  with patient is protuberant. There is no distension.      Palpations: Abdomen is soft.      Tenderness: There is abdominal tenderness in the right upper quadrant and right lower quadrant. There is guarding and rebound.   Musculoskeletal:         General: No swelling, deformity or signs of injury.   Skin:     Coloration: Skin is not jaundiced.      Findings: No bruising, erythema or rash.   Neurological:      General: No focal deficit present.      Mental Status: She is alert and oriented to person, place, and time.   Psychiatric:         Mood and Affect: Mood normal.         Behavior: Behavior normal. Behavior is cooperative.         Thought Content: Thought content normal.         Judgment: Judgment normal.      ----------------------------------------------------------------------------------------------------------------------  Tele: Normal sinus rhythm with heart rates 80s to 90s.  I personally reviewed the telemetry strips.  ----------------------------------------------------------------------------------------------------------------------  LABS:    CBC and coagulation:  Results from last 7 days   Lab Units 02/13/21  0009 02/11/21  0414 02/10/21  1604   PROCALCITONIN ng/mL  --  0.21  --    LACTATE mmol/L  --   --  0.9   SED RATE mm/hr  --   --  67*   CRP mg/dL  --  13.06* 13.77*   WBC 10*3/mm3 9.50 9.62 8.33   HEMOGLOBIN g/dL 8.8* 9.3* 9.2*   HEMATOCRIT % 28.4* 31.3* 30.5*   MCV fL 102.9* 107.6* 104.8*   MCHC g/dL 31.0* 29.7* 30.2*   PLATELETS 10*3/mm3 284 293 239     Renal and electrolytes:  Results from last 7 days   Lab Units 02/13/21  0009 02/12/21  2103 02/12/21  0221 02/11/21  1109 02/11/21  0414   SODIUM mmol/L 131*  --  135* 138  --    POTASSIUM mmol/L 5.0  --  4.6 5.0  --    MAGNESIUM mg/dL 2.5 2.6  --   --  1.9   CHLORIDE mmol/L 94*  --  98 107  --    CO2 mmol/L 24.8  --  27.5 21.0*  --    BUN mg/dL 27*  --  18 30*  --    CREATININE mg/dL 2.79*  --  2.04* 2.59*  --    EGFR IF NONAFRICN AM mL/min/1.73 18*  --   26* 19*  --    CALCIUM mg/dL 9.2  --  9.0 9.1  --    PHOSPHORUS mg/dL 4.2  --  3.9  --   --    GLUCOSE mg/dL 83  --  90 92  --      Estimated Creatinine Clearance: 30.4 mL/min (A) (by C-G formula based on SCr of 2.79 mg/dL (H)).    Liver and pancreatic function:  Results from last 7 days   Lab Units 02/12/21  0221 02/10/21  1604   ALBUMIN g/dL 3.46* 3.49*   BILIRUBIN mg/dL 0.3 <0.2   ALK PHOS U/L 68 75   AST (SGOT) U/L 15 17   ALT (SGPT) U/L 10 16   AMYLASE U/L  --  84   LIPASE U/L  --  107*     Endocrine function:  Lab Results   Component Value Date    HGBA1C 4.90 02/10/2021     Glucose levels from the CMP:  Results from last 7 days   Lab Units 02/13/21  0009 02/12/21  0221 02/11/21  1109 02/10/21  1604   GLUCOSE mg/dL 83 90 92 113*     Lab Results   Component Value Date    TSH 7.770 (H) 02/10/2021    FREET4 1.06 02/10/2021     Cardiac:  Results from last 7 days   Lab Units 02/10/21  2216 02/10/21  1604   TROPONIN T ng/mL <0.010 <0.010       Cultures:  Lab Results   Component Value Date    COLORU Yellow 02/10/2021    CLARITYU Clear 02/10/2021    PHUR <=5.0 02/10/2021    GLUCOSEU Negative 02/10/2021    KETONESU Negative 02/10/2021    BLOODU Negative 02/10/2021    NITRITEU Negative 02/10/2021    LEUKOCYTESUR Negative 02/10/2021    BILIRUBINUR Negative 02/10/2021    UROBILINOGEN 0.2 E.U./dL 02/10/2021    RBCUA 0-2 02/10/2021    WBCUA 0-2 02/10/2021    BACTERIA None Seen 02/10/2021     Microbiology Results (last 10 days)     Procedure Component Value - Date/Time    COVID-19 and FLU A/B PCR - Swab, Nasopharynx [[id number]]  (Normal) Collected: 02/10/21 4107    Lab Status: Final result Specimen: Swab from Nasopharynx Updated: 02/10/21 1831     COVID19 Not Detected     Influenza A PCR Not Detected     Influenza B PCR Not Detected    Narrative:      Fact sheet for providers: https://www.fda.gov/media/903140/download    Fact sheet for patients: https://www.fda.gov/media/439544/download    Test performed by PCR.    Blood  Culture - Blood, Arm, Left [[id number]] Collected: 02/10/21 1658    Lab Status: Preliminary result Specimen: Blood from Arm, Left Updated: 02/13/21 1701     Blood Culture No growth at 3 days    Blood Culture - Blood, Arm, Right [[id number]] Collected: 02/10/21 1604    Lab Status: Preliminary result Specimen: Blood from Arm, Right Updated: 02/13/21 1701     Blood Culture No growth at 3 days        I have personally looked at the labs and they are summarized above.    Assessment & Plan      -RUQ abdominal pain in a patient with known constipation   -Mildly abnormal HIDA scan  -End-stage renal disease on hemodialysis with 1 week of missed hemodialysis sessions  -Mild hyponatremia, suspect due to end-stage renal disease  -Macrocytic anemia due to anemia of chronic disease  -History of essential hypertension  -History of hypothyroidism  -Mild hyperglycemia without history of diabetes  -Morbid obesity, BMI 42.1 kg/m2  -Tobacco smoking addiction    I will give the patient 2000 mL of GoLYTELY to see if this will relieve the constipation.  I suspect that the patient may have some gallbladder dysfunction causing the right upper quadrant pain and thus I will asked general surgery to see her tomorrow; do not think they would remove her gallbladder inpatient but they might be able to meet her and work her into the outpatient clinic.  I will hold off on obtaining blood work tomorrow as she just had hemodialysis today.    VTE Prophylaxis:   Mechanical Order History:     None      Pharmalogical Order History:      Ordered     Dose Route Frequency Stop    02/10/21 2228  enoxaparin (LOVENOX) syringe 30 mg      30 mg SC Daily --    02/10/21 2153  Pharmacy to Dose enoxaparin (LOVENOX)     Question:  Indication of use  Answer:  Prophylaxis    -- XX Continuous PRN --              Miriam Goldberg MD  [facility] [place]  02/13/21  19:13 EST

## 2024-10-01 NOTE — ED NOTES
Called [facility] for medical records. Talked to [facility], she is going to fax them over.      Naomi De Los Santos  07/16/20 3185    
Patient requesting pain medication for headache at this time. Provider made aware. Awaiting new orders.     Veronika Perry RN  07/16/20 1034    
Statement Selected

## (undated) DEVICE — ENDOCUT SCISSOR TIP, DISPOSABLE: Brand: RENEW

## (undated) DEVICE — TROCAR: Brand: KII FIOS FIRST ENTRY

## (undated) DEVICE — PATIENT RETURN ELECTRODE, SINGLE-USE, CONTACT QUALITY MONITORING, ADULT, WITH 9FT CORD, FOR PATIENTS WEIGING OVER 33LBS. (15KG): Brand: MEGADYNE

## (undated) DEVICE — NDL HYPO PRECISIONGLIDE REG 25G 1 1/2

## (undated) DEVICE — HOLDER: Brand: DEROYAL

## (undated) DEVICE — SUT VIC 2/0 UR6 27IN J602H

## (undated) DEVICE — TROCAR: Brand: KII® SLEEVE

## (undated) DEVICE — NDL HYPO PRECISIONGLIDE REG 22G 1 1/2

## (undated) DEVICE — DRSNG SURESITE WNDW 4X4.5

## (undated) DEVICE — COR LAP CHOLE: Brand: MEDLINE INDUSTRIES, INC.

## (undated) DEVICE — SUT NLY 2/0 664G

## (undated) DEVICE — SUT VIC 3/0 SH 27IN J416H

## (undated) DEVICE — NDL HYPO ECLPS SFTY 18G 1 1/2IN

## (undated) DEVICE — TOWEL,OR,DSP,ST,BLUE,STD,4/PK,20PK/CS: Brand: MEDLINE

## (undated) DEVICE — CYSTO/BLADDER IRRIGATION SET, REGULATING CLAMP

## (undated) DEVICE — [HIGH FLOW INSUFFLATOR,  DO NOT USE IF PACKAGE IS DAMAGED,  KEEP DRY,  KEEP AWAY FROM SUNLIGHT,  PROTECT FROM HEAT AND RADIOACTIVE SOURCES.]: Brand: PNEUMOSURE

## (undated) DEVICE — ENDOPATH XCEL BLADELESS TROCARS WITH STABILITY SLEEVES: Brand: ENDOPATH XCEL

## (undated) DEVICE — PK BASIC 70

## (undated) DEVICE — APPL CHLORAPREP HI/LITE 26ML ORNG

## (undated) DEVICE — ENDOPATH ELECTROSURGERY PROBE PLUS II 5MM RIGHT ANGLE MONOPOLAR ELECTROSURGERY SUCTION AND IRRRIGATION SHAFTS WITH RIGHT ANGLE ELECTRODE - USE ONLY WITH PROBE PLUS II HANDLES: Brand: ENDOPATH

## (undated) DEVICE — DRAPE,UTILTY,TAPE,15X26, 4EA/PK: Brand: MEDLINE

## (undated) DEVICE — DRP C/ARM W/BAND W/CLIPS 41X74IN

## (undated) DEVICE — SKIN AFFIX SURG ADHESIVE 72/CS 0.55ML: Brand: MEDLINE

## (undated) DEVICE — SUT MNCRYL PLS ANTIB UD 4/0 PS2 18IN

## (undated) DEVICE — ENDOPATH ELECTROSURGERY PROBE PLUS II PISTOL HAND CONTROL PISTOL GRIP HANDLES WITH SUCTION AND IRRRIGATION FOR HAND CONTROL MONOPOLAR ELCTROSURGERY USE ONLY WITH PROBE PLUS II SHAFTS: Brand: ENDOPATH

## (undated) DEVICE — CONN IV MAXPLUS NDLESS ACC

## (undated) DEVICE — BIOPATCH™ ANTIMICROBIAL DRESSING WITH CHLORHEXIDINE GLUCONATE IS A HYDROPHILLIC POLYURETHANE ABSORPTIVE FOAM WITH CHLORHEXIDINE GLUCONATE (CHG) WHICH INHIBITS BACTERIAL GROWTH UNDER THE DRESSING. THE DRESSING IS INTENDED TO BE USED TO ABSORB EXUDATE, COVER A WOUND CAUSED BY VASCULAR AND NONVASCULAR PERCUTANEOUS MEDICAL DEVICES DURING SURGERY, AS WELL AS REDUCE LOCAL INFECTION AND COLONIZATION OF MICROORGANISMS.: Brand: BIOPATCH

## (undated) DEVICE — DRAPE,T,LAPARO,TRANS,STERILE: Brand: MEDLINE

## (undated) DEVICE — SYR LUERLOK 30CC

## (undated) DEVICE — KT CATH HEMO CANNON2 PLS LNG TRM 15F 11IN

## (undated) DEVICE — KT CATH HEMO CANNON2 PLS LNG TRM 15F 13IN

## (undated) DEVICE — GLV SURG PREMIERPRO MIC LTX PF SZ7.5 BRN

## (undated) DEVICE — TRY DRSNG CENTRL LN UC44 FCP

## (undated) DEVICE — SPNG GZ WOVN 4X4IN 12PLY 10/BX STRL

## (undated) DEVICE — ANTIBACTERIAL UNDYED BRAIDED (POLYGLACTIN 910), SYNTHETIC ABSORBABLE SUTURE: Brand: COATED VICRYL

## (undated) DEVICE — ENCORE® LATEX MICRO SIZE 7.5, STERILE LATEX POWDER-FREE SURGICAL GLOVE: Brand: ENCORE